# Patient Record
Sex: MALE | Race: WHITE | NOT HISPANIC OR LATINO | Employment: FULL TIME | URBAN - METROPOLITAN AREA
[De-identification: names, ages, dates, MRNs, and addresses within clinical notes are randomized per-mention and may not be internally consistent; named-entity substitution may affect disease eponyms.]

---

## 2017-11-22 DIAGNOSIS — M25.512 PAIN IN LEFT SHOULDER: ICD-10-CM

## 2017-12-05 ENCOUNTER — GENERIC CONVERSION - ENCOUNTER (OUTPATIENT)
Dept: OTHER | Facility: OTHER | Age: 32
End: 2017-12-05

## 2018-01-24 VITALS
SYSTOLIC BLOOD PRESSURE: 120 MMHG | DIASTOLIC BLOOD PRESSURE: 60 MMHG | HEIGHT: 70 IN | BODY MASS INDEX: 26.84 KG/M2 | WEIGHT: 187.5 LBS

## 2018-02-19 VITALS
BODY MASS INDEX: 25.77 KG/M2 | HEART RATE: 64 BPM | HEIGHT: 70 IN | DIASTOLIC BLOOD PRESSURE: 75 MMHG | SYSTOLIC BLOOD PRESSURE: 116 MMHG | WEIGHT: 180 LBS

## 2018-02-19 DIAGNOSIS — M54.50 ACUTE RIGHT-SIDED LOW BACK PAIN WITHOUT SCIATICA: ICD-10-CM

## 2018-02-19 DIAGNOSIS — S73.191A TEAR OF RIGHT ACETABULAR LABRUM, INITIAL ENCOUNTER: Primary | ICD-10-CM

## 2018-02-19 PROCEDURE — 99214 OFFICE O/P EST MOD 30 MIN: CPT | Performed by: ORTHOPAEDIC SURGERY

## 2018-02-19 RX ORDER — IBUPROFEN 200 MG
200 TABLET ORAL EVERY 6 HOURS PRN
COMMUNITY
End: 2018-08-28 | Stop reason: ALTCHOICE

## 2018-02-19 NOTE — PROGRESS NOTES
Assessment/Plan:  1  Tear of right acetabular labrum, initial encounter         Ab Velasquez has done extensive self-guided physical therapy with no relief of his symptoms  He does have a history of right hip partial labrum tear  MRI of his back from August 2017 done at [facility] was reviewed and did not show any significant findings to suggest his symptoms  We did request an official radiology report from [facility], the report is pending  We believe that Richard's symptoms may be stemming from his right hip labrum tear, that likely has worsened  As a result, we would like to do a diagnostic and therapeutic trial with an intra-articular hip injection  Order was placed today  We will tailor our treatment plan based on his response to therapy  In the meantime, we recommended continued self-guided physical therapy  Ab Velasquez wants to undertake conservative measures until after he gets  in April  Subjective:   Patient ID: Ambrosio Turpin is a 26 y o  male  HPI    Ab Velasquez is a 26year old male that comes to the office due to concerns of right-sided back pain  Of note, Ab Velasquez does report a history of right hip, partial labrum tear diagnosed approximately 3-4 years ago  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[facility]      He states that he likely experienced a back injury in August of 2017 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  He stated that he had a "twinge" at the time, but pain was accentuated a couple weeks later when he went to go  something from the floor  He subsequently obtained an MRI at [facility] which was reported as being grossly within normal limits  He has been performed self-guided physical therapy at home, completing Bhargav protocol A&B  He has also tried electro stimulation, heat, both not providing much relief  Currently, he is experiencing right-sided low-back pain, constant, aching in quality, 7-8/10 intensity, occasional radiation into the groin and down the anterior thigh  Aggravated by twisting motions  He also mentioned that he has to be extra cautious with certain exercises at the gym such as squats and leg press  Alleviated with lying on his back  Denies weakness, tingling, incontinence  Review of Systems   Constitutional: Negative for chills and fever  HENT: Negative for congestion, rhinorrhea and sore throat  Eyes: Negative for visual disturbance  Respiratory: Negative for shortness of breath and wheezing  Cardiovascular: Negative for chest pain and palpitations  Gastrointestinal: Negative for abdominal pain, constipation, diarrhea, nausea and vomiting  Genitourinary: Negative for dysuria  Musculoskeletal: Positive for back pain and myalgias  Negative for neck stiffness  Skin: Negative for rash  Neurological: Negative for weakness, numbness and headaches  Psychiatric/Behavioral: Negative for confusion  History reviewed  No pertinent past medical history  History reviewed  No pertinent surgical history  Family History   Problem Relation Age of Onset    Diabetes Father        Social History     Occupational History    Not on file  Social History Main Topics    Smoking status: Never Smoker    Smokeless tobacco: Never Used    Alcohol use No    Drug use: No    Sexual activity: Not on file         Current Outpatient Prescriptions:     ibuprofen (MOTRIN) 200 mg tablet, Take 200 mg by mouth every 6 (six) hours as needed for mild pain, Disp: , Rfl:     No Known Allergies    Objective:  Vitals:    02/19/18 1008   BP: 116/75   Pulse: 64       Right Hip Exam     Tenderness   The patient is experiencing no tenderness  Range of Motion   The patient has normal right hip ROM  Muscle Strength   The patient has normal right hip strength  Tests   ANDRE: negative    Comments:  + FADIR test      Back Exam     Tenderness   The patient is experiencing tenderness in the lumbar (right L4-L5 tenderness)      Range of Motion   The patient has normal back ROM  Muscle Strength   The patient has normal back strength  Tests   Straight leg raise right: negative  Straight leg raise left: negative    Comments:  + standing flexion on the right  Negative sitting flexion tests            Physical Exam   Constitutional: He is oriented to person, place, and time  He appears well-developed and well-nourished  No distress  HENT:   Head: Normocephalic and atraumatic  Right Ear: External ear normal    Left Ear: External ear normal    Eyes: Conjunctivae and EOM are normal  Right eye exhibits no discharge  Left eye exhibits no discharge  Neck: Neck supple  Neurological: He is alert and oriented to person, place, and time  Skin: Skin is warm and dry  He is not diaphoretic  Psychiatric: He has a normal mood and affect  I have personally reviewed pertinent films in PACS and my interpretation is MRI of lumbar spine showing slight disc bulging at levels L3-L4, L4-L5, L5-S1 without significant herniation  Adequate intra-vertebral spacing

## 2018-03-13 ENCOUNTER — HOSPITAL ENCOUNTER (OUTPATIENT)
Dept: RADIOLOGY | Facility: HOSPITAL | Age: 33
Discharge: HOME | End: 2018-03-13
Attending: RADIOLOGY

## 2018-03-13 DIAGNOSIS — Z76.89 REFERRAL OF PATIENT WITHOUT EXAMINATION OR TREATMENT: ICD-10-CM

## 2018-04-17 ENCOUNTER — TRANSCRIBE ORDERS (OUTPATIENT)
Dept: URGENT CARE | Facility: CLINIC | Age: 33
End: 2018-04-17

## 2018-04-17 DIAGNOSIS — Z02.89 ENCOUNTER FOR OCCUPATIONAL PHYSICAL EXAMINATION: Primary | ICD-10-CM

## 2018-04-17 PROCEDURE — 86480 TB TEST CELL IMMUN MEASURE: CPT | Performed by: PHYSICIAN ASSISTANT

## 2018-06-06 VITALS
WEIGHT: 176 LBS | HEIGHT: 70 IN | HEART RATE: 66 BPM | SYSTOLIC BLOOD PRESSURE: 120 MMHG | DIASTOLIC BLOOD PRESSURE: 79 MMHG | BODY MASS INDEX: 25.2 KG/M2

## 2018-06-06 DIAGNOSIS — M25.551 PAIN IN RIGHT HIP: Primary | ICD-10-CM

## 2018-06-06 PROBLEM — S43.402A SPRAIN OF SHOULDER, LEFT: Status: ACTIVE | Noted: 2017-12-05

## 2018-06-06 PROBLEM — S44.92XA NEUROPRAXIA OF LEFT UPPER EXTREMITY: Status: ACTIVE | Noted: 2017-12-05

## 2018-06-06 PROCEDURE — 99203 OFFICE O/P NEW LOW 30 MIN: CPT | Performed by: ORTHOPAEDIC SURGERY

## 2018-06-06 NOTE — PROGRESS NOTES
Patient Name:  Miguelito Nugent  MRN:  [id number]    Assessment & Plan    Right hip pain  Possible small labral tear versus sublabral sulcus  1  Patient wishes to continue conservative management at this time  2  Referral provided for physical therapy  3  Anti-inflammatories as needed  4  Follow-up if pain persists after six to eight weeks of conservative management  Consider intra-articular cortisone injection versus repeat MR arthrogram at that time as appropriate      Chief Complaint    Right hip pain      History of the Present Illness    47-year-old male reports to the office today for evaluation of his right hip  He has been experiencing intermittent right hip pain for the last 5-7 years  He denies any specific injury or trauma but associates the pain to playing baseball  He was seen in the past and underwent an MR arthrogram in 2016  He was treated conservatively with physical therapy and over-the-counter anti-inflammatories  He notes pain specifically in the groin and posterior hip  He notes occasional radiation distally along the thigh to the knee  He denies any significant numbness or tingling  No weakness or instability  No fevers or chills  Physical Exam    /79   Pulse 66   Ht 5' 10" (1 778 m)   Wt 79 8 kg (176 lb)   BMI 25 25 kg/m²     Right hip:  No gross deformity  No tenderness to palpation greater trochanter and anterior hip  Hip range of motion is intact and full without discomfort  Negative FADDIR and ANDRE test   Negative logroll test   Negative straight leg raise and resisted straight leg raise test   Sensation intact right lower extremity    Constitutional:  Well-developed and well-nourished  Eyes:  Anicteric sclerae  Neck:  Supple  Lungs:  Unlabored breathing  Cardiovascular:  Capillary refill is less than 2 seconds  Skin:  Intact without erythema  Neurologic:  Sensation intact to light touch  Psychiatric:  Mood and affect are appropriate        Data Review    I have personally reviewed pertinent films in PACS, and my interpretation follows  MR arthrogram right hip performed 8/4/16 reveals contrast admission into the under surface of the lateral margin of the superior labrum  This could represent a sublabral recess or possibly a small undersurface labral tear  History reviewed  No pertinent past medical history  History reviewed  No pertinent surgical history  No Known Allergies    Current Outpatient Prescriptions on File Prior to Visit   Medication Sig Dispense Refill    ibuprofen (MOTRIN) 200 mg tablet Take 200 mg by mouth every 6 (six) hours as needed for mild pain       No current facility-administered medications on file prior to visit  Social History   Substance Use Topics    Smoking status: Never Smoker    Smokeless tobacco: Never Used    Alcohol use No       Family History   Problem Relation Age of Onset    Diabetes Father     Atrial fibrillation Father          Review of Systems    General:  Negative for fever, lethargy/malaise, or night sweats  Eyes:  Negative for blurry vision or double vision  ENT:  Negative for hearing change, nasal discharge, or sore throat  Hematological:  Negative for bleeding problems or blood clots  Endocrine:  Negative for excessive thirst or temperature intolerance  Respiratory:  Negative for cough or wheezing  Cardiovascular:  Negative for chest pain, dyspnea on exertion, or palpitations  Gastrointestinal:  Negative for abdominal pain, diarrhea, or nausea/vomiting  Musculoskeletal:  As stated in the HPI and otherwise negative  Neurological:  Negative for confusion, headaches, or seizures  Psychological:  Negative for hallucinations or mood swings  Dermatological:  Negative for itching or rash        Scribe Attestation    I,:   Yumiko Rush PA-C am acting as a scribe while in the presence of the attending physician :        I,:   Haydee Laird MD personally performed the services described in this documentation    as scribed in my presence :

## 2018-06-26 ENCOUNTER — TELEPHONE (OUTPATIENT)
Dept: PAIN MEDICINE | Facility: CLINIC | Age: 33
End: 2018-06-26

## 2018-06-26 PROBLEM — S73.191A TEAR OF RIGHT ACETABULAR LABRUM: Status: ACTIVE | Noted: 2018-06-26

## 2018-06-26 NOTE — TELEPHONE ENCOUNTER
Scheduled pt for Rt Intra-articular hip injection for 7/13/18  ([id number]) Salome garcia pre-procedure instructions below:  Nothing to eat or drink 1 hr prior to procedure  Need to arrange transportation  Proper clothing for procedure  If ill or placed on antibiotics please call to reschedule

## 2018-07-13 ENCOUNTER — HOSPITAL ENCOUNTER (OUTPATIENT)
Facility: AMBULARY SURGERY CENTER | Age: 33
Setting detail: OUTPATIENT SURGERY
Discharge: HOME | End: 2018-07-13
Attending: ANESTHESIOLOGY | Admitting: ANESTHESIOLOGY
Payer: COMMERCIAL

## 2018-07-13 ENCOUNTER — HOSPITAL ENCOUNTER (OUTPATIENT)
Dept: RADIOLOGY | Facility: HOSPITAL | Age: 33
Setting detail: OUTPATIENT SURGERY
Discharge: HOME | End: 2018-07-13
Payer: COMMERCIAL

## 2018-07-13 VITALS
OXYGEN SATURATION: 98 % | HEART RATE: 67 BPM | RESPIRATION RATE: 16 BRPM | DIASTOLIC BLOOD PRESSURE: 56 MMHG | TEMPERATURE: 98.7 F | SYSTOLIC BLOOD PRESSURE: 120 MMHG

## 2018-07-13 PROCEDURE — 20610 DRAIN/INJ JOINT/BURSA W/O US: CPT | Performed by: ANESTHESIOLOGY

## 2018-07-13 PROCEDURE — 77002 NEEDLE LOCALIZATION BY XRAY: CPT | Performed by: ANESTHESIOLOGY

## 2018-07-13 PROCEDURE — 73501 X-RAY EXAM HIP UNI 1 VIEW: CPT

## 2018-07-13 RX ORDER — METHYLPREDNISOLONE ACETATE 80 MG/ML
INJECTION, SUSPENSION INTRA-ARTICULAR; INTRALESIONAL; INTRAMUSCULAR; SOFT TISSUE AS NEEDED
Status: DISCONTINUED | OUTPATIENT
Start: 2018-07-13 | End: 2018-07-13 | Stop reason: HOSPADM

## 2018-07-13 RX ORDER — LIDOCAINE WITH 8.4% SOD BICARB 0.9%(10ML)
SYRINGE (ML) INJECTION AS NEEDED
Status: DISCONTINUED | OUTPATIENT
Start: 2018-07-13 | End: 2018-07-13 | Stop reason: HOSPADM

## 2018-07-13 RX ORDER — BUPIVACAINE HYDROCHLORIDE 2.5 MG/ML
INJECTION, SOLUTION EPIDURAL; INFILTRATION; INTRACAUDAL AS NEEDED
Status: DISCONTINUED | OUTPATIENT
Start: 2018-07-13 | End: 2018-07-13 | Stop reason: HOSPADM

## 2018-07-13 NOTE — DISCHARGE INSTRUCTIONS
Steroid Joint Injection   WHAT YOU NEED TO KNOW:   A steroid joint injection is a procedure to inject steroid medicine into a joint  Steroid medicine decreases pain and inflammation  The injection may also contain an anesthetic (numbing medicine) to decrease pain  It may be done to treat conditions such as arthritis, gout, or carpal tunnel syndrome  The injections may be given in your knee, ankle, shoulder, elbow, wrist, ankle or sacroiliac joint  1  Do not apply heat to any area that is numb  If you have discomfort or soreness at the injection site, you may apply ice today, 20 minutes on and 20 minutes off  Tomorrow you may use ice or warm, moist heat  Do not apply ice or heat directly to the skin  2  You may have an increase or change in the discomfort for 36-48 hours after your treatment  Apply ice and continue with any pain medicine you have been prescribed  3  Do not do anything strenuous today  You may shower, but no tub baths or hot tubs today  You may resume your normal activities tomorrow, but do not overdo it  Resume normal activities slowly when you are feeling better  4  If you experience redness, drainage or swelling at the injection site, or if you develop a fever above 100 degrees, please call [facility] at [phone] or go to the Emergency Room  5  Continue to take all routine medicines prescribed by your primary care physician unless otherwise instructed by our staff  Most blood thinners should be started again according to your regularly scheduled dosing  If you have any questions, please give our office a call  If you have a problem specifically related to your procedure, please call our office at [phone]  Problems not related to your procedure should be directed to your primary care physician

## 2018-07-13 NOTE — H&P
History of Present Illness: The patient is a 26 y o  male who presents with complaints of right hip pain  Patient Active Problem List   Diagnosis    Neuropraxia of left upper extremity    Sprain of shoulder, left    Pain in right hip    Tear of right acetabular labrum       History reviewed  No pertinent past medical history  History reviewed  No pertinent surgical history  No current facility-administered medications for this encounter  No Known Allergies    Physical Exam:   Vitals:    07/13/18 1107   BP: 111/59   Pulse: 72   Resp: 16   Temp: 98 7 °F (37 1 °C)   SpO2: 98%     General: Awake, Alert, Oriented x 3, Mood and affect appropriate  Respiratory: Respirations even and unlabored  Cardiovascular: Peripheral pulses intact; no edema  Musculoskeletal Exam:  Tenderness noted in the right hip region    ASA Score:  3         Assessment:  Right hip pain  Plan:  Proceed with right hip intra-articular joint injection

## 2018-07-13 NOTE — OP NOTE
ATTENDING PHYSICIAN:  Winsome Ren MD     PROCEDURE: Right sacroiliac joint injection with steroid and local anesthetic under fluoroscopic guidance  PREPROCEDURE DIAGNOSIS:  Sacroiliitis  POSTPROCEDURE DIAGNOSIS: Sacroiliitis  ANESTHESIA:  Local     ESTIMATED BLOOD LOSS:  Minimal     COMPLICATIONS:  None  LOCATION:  [address]  CONSENT:  Today's procedure, its potential benefits as well as its risks and potential side effects were reviewed  Discussed risks of the procedure including bleeding, infection, nerve irritation or damage, reactions to the medications, headache, failure of the pain to improve, and potential worsening of the pain were explained to the patient who verbalized understanding and who wished to proceed  Written informed consent was thereby obtained  DESCRIPTION OF THE PROCEDURE:  After written informed consent was obtained, the patient was taken to the fluoroscopy suite and placed in the prone position  Anatomical landmarks were identified by way of fluoroscopy in multiple views  The skin overlying the sacroiliac region was prepped using antiseptic and draped in the usual sterile fashion  Strict aseptic technique was utilized  The skin and subcutaneous tissues at the needle entry site were infiltrated with 5 mL of 1% preservative-free lidocaine using a 25-gauge 1-1/2-inch needle  A 22-gauge 3-1/2-inch needle was then incrementally advanced using multiple fluoroscopic views into the inferior posterior pole of the sacroiliac joint  Proper needle placement was confirmed with the aid of fluoroscopy in the AP and lateral views  After negative aspiration, contrast was injected which delineated the sacroiliac joint under fluoroscopy in the AP view  After negative aspiration was reconfirmed, a 3 mL mixture consisting of 2 mL of preservative-free 0 25% bupivacaine mixed with 1 mL of 80 mg/mL of Depo-Medrol was slowly injected      The patient tolerated the procedure well and all needles were removed with the tips intact  Hemostasis was maintained  There were no apparent paresthesias or complications  The skin was wiped clean and a Band-Aid was placed as appropriate  The patient was monitored for an appropriate period of time following the procedure and remained hemodynamically stable and neurovascularly intact following the procedure  The patient was ultimately discharged to home with supervision in good condition and instructed to call the office in a few days for an update or sooner as warranted  I was present and participated in all key and critical portions of this procedure      Mick Mcmanus MD  7/13/2018  12:14 PM

## 2018-07-24 ENCOUNTER — TELEPHONE (OUTPATIENT)
Dept: PAIN MEDICINE | Facility: CLINIC | Age: 33
End: 2018-07-24

## 2018-07-24 NOTE — TELEPHONE ENCOUNTER
S/p Intra-articular hip joint injection on 7/13/18 by Dr Jese Royal, no follow up scheduled  Spoke w/ pt and he stated that he received 25% relief and rates his pain 5/10  He said he feels discomfort and it comes and goes  Other than that hes doing pretty good

## 2018-08-06 NOTE — OP NOTE
ATTENDING PHYSICIAN:  Floyd Cavazos MD      PREPROCEDURE DIAGNOSIS:  Right hip pain  POSTPROCEDURE DIAGNOSIS: Right hip pain  PROCEDURE: Right intraarticular hip injection under fluoroscopic guidance  ANESTHESIA:  Local     ESTIMATED BLOOD LOSS:  Minimal     COMPLICATIONS:  None  LOCATION:  [address]  CONSENT:  Today's procedure, its risks, benefits, and alternatives were explained in detail to the patient  Risks include, but are not limited bleeding, infection, hematoma formation, abscess formation, weakness, nerve irritation or damage, failure of the pain to improve, or potential worsening of the pain  The patient verbalized understanding and wished to proceed with the procedure  Written informed consent was thereby obtained  DESCRIPTION OF THE PROCEDURE:  After written informed consent was obtained, the patient was taken to the fluoroscopy suite and placed in the supine position  Anatomical landmarks were identified by way of fluoroscopy in the AP and oblique views  The patient's hip region was prepped using antiseptic solution and draped in the usual sterile fashion  Strict aseptic technique was utilized  The skin and subcutaneous tissues at the needle entry site was infiltrated with a small amount of 1% preservative free Lidocaine using a 25-gauge 1-1/2 inch needle  A 22 gauge 3-1/2 inch needle was then advanced incrementally under fluoroscopic guidance towards the identified point until os was contacted and the joint space was entered  After negative aspiration, 1 ml of contrast solution was injected showing an appropriate arthrogram   Subsequently, a solution consisting of 4 ml of 0 25% Bupivacaine mixed with 1 ml of Depo-Medrol 80 mg/ml was injected slowly  All needles were removed with the tips intact and hemostasis was maintained throughout    The patient tolerated the procedure well, and there were no apparent paresthesias or complications noted during or following this procedure  The antiseptic was wiped clean and Band-Aids were placed as appropriate  The patient was transferred to the recovery area and was observed for an appropriate period of time during which the patient remained hemodynamically stable and neurovascularly intact, as the patient was prior to the procedure  The patient was subsequently discharged to home in stable condition with supervision  The patient was instructed to call the office in a few days for an update  Discharge instructions were provided  I was present and participated in all key and critical portions of this procedure      Sade Eaton MD  8/6/2018  9:40 AM

## 2018-08-28 ENCOUNTER — TELEPHONE (OUTPATIENT)
Dept: OBGYN CLINIC | Facility: CLINIC | Age: 33
End: 2018-08-28

## 2018-08-28 DIAGNOSIS — S73.191A TEAR OF RIGHT ACETABULAR LABRUM, INITIAL ENCOUNTER: Primary | ICD-10-CM

## 2018-08-28 RX ORDER — NAPROXEN 500 MG/1
500 TABLET ORAL 2 TIMES DAILY WITH MEALS
Qty: 60 TABLET | Refills: 0 | Status: SHIPPED | OUTPATIENT
Start: 2018-08-28 | End: 2019-09-30

## 2018-08-28 RX ORDER — CYCLOBENZAPRINE HCL 10 MG
10 TABLET ORAL 3 TIMES DAILY PRN
Qty: 20 TABLET | Refills: 0 | Status: SHIPPED | OUTPATIENT
Start: 2018-08-28 | End: 2019-10-14 | Stop reason: ALTCHOICE

## 2018-08-28 NOTE — TELEPHONE ENCOUNTER
Patient called, having increased pain right hip     Are we able to call in an anti-inflammatory/muscle relaxer?  nubia    Can he see someone for his hip

## 2018-08-28 NOTE — TELEPHONE ENCOUNTER
Meds sent to [facility]    Referral for dr Rudi Mosley placed in chart  He sees patients in [place], he can determine if he needs a repeat hip MRI or not

## 2018-08-31 ENCOUNTER — APPOINTMENT (OUTPATIENT)
Dept: RADIOLOGY | Facility: CLINIC | Age: 33
End: 2018-08-31
Payer: COMMERCIAL

## 2018-08-31 VITALS
SYSTOLIC BLOOD PRESSURE: 105 MMHG | WEIGHT: 184 LBS | DIASTOLIC BLOOD PRESSURE: 69 MMHG | HEART RATE: 66 BPM | BODY MASS INDEX: 26.34 KG/M2 | HEIGHT: 70 IN

## 2018-08-31 DIAGNOSIS — S73.191A TEAR OF RIGHT ACETABULAR LABRUM, INITIAL ENCOUNTER: ICD-10-CM

## 2018-08-31 PROCEDURE — 73502 X-RAY EXAM HIP UNI 2-3 VIEWS: CPT

## 2018-08-31 PROCEDURE — 99214 OFFICE O/P EST MOD 30 MIN: CPT | Performed by: ORTHOPAEDIC SURGERY

## 2018-08-31 NOTE — PROGRESS NOTES
Orthopaedic Surgery - Office Note  Susie Paget (26 y o  male)   : 1985   MRN: [id number]  Encounter Date: 2018    Chief Complaint   Patient presents with    Right Hip - Pain       Assessment / Plan  Right hip pain with likely labral tear    · At this time we will obtain a MRI arthrogram of the right hip to further evaluate patient's possible labral tear  · Continue with exercise program   · Continue with ice and analgesics as needed  Return for Follow-up after MRI arthrogram study  History of Present Illness  Susie Paget is a 26 y o  male who presents for evaluation of right hip pain and clicking which been present for years  Patient believes is been ongoing problem off and on for father 7 years but increasing lately  He denies any history of injury to the right hip  The patient states he did have an MRI arthrogram in  which showed a possible superior labral tear  Since he has done exercises for 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  When asked where the pain is he points to right groin and states it radiates around in a C pattern to the gluteal area  Occasionally gets some thigh pain that radiates to the knee  Patient does have palpable clicking that he can reproduce when going from flexion to extension in both hips  The clicking does not cause pain it actually seems to relieve some pressure in the area  He did undergo a fluoroscopy guided hip injection on 2018 and feels that the injection did help for a few days but he slowly progressed to where he was previously  Currently takes over-the-counter medication as needed for the pain  Review of Systems  Pertinent items are noted in HPI  All other systems were reviewed and are negative  Physical Exam  /69   Pulse 66   Ht 5' 10" (1 778 m)   Wt 83 5 kg (184 lb)   BMI 26 40 kg/m²   Cons: Appears well  No apparent distress  Psych: Alert  Oriented x3    Mood and affect normal   Eyes: SALVADOR EOMI  Resp: Normal effort  No audible wheezing or stridor  CV: Palpable pulse  No discernable arrhythmia  No LE edema  Lymph:  No palpable cervical, axillary, or inguinal lymphadenopathy  Skin: Warm  No palpable masses  No visible lesions  Neuro: Normal muscle tone  Normal and symmetric DTR's  Right Hip Exam  Alignment / Posture:  Normal resting hip posture  Normal knee alignment  Inspection:  No swelling  No edema  No deformity  Palpation:  No tenderness  ROM:  Normal hip ROM  Hip Flexion 110°  Hip ER 75°  Hip IR 20° with pain in the groin  Strength:  5/5 hip flexors and abductors  5/5 quadriceps and hamstrings  Stability:  No objective hip instability  (-) Logroll  (-) Anterior apprehension test  (-) Posterior apprehension test   Tests:  (+) FADDIR  (-) ANDRE  (-) Straight leg raise  Neurovascular:  Sensation intact in DP/SP/Watts/Sa/T nerve distributions  Sensation intact in all digital nerve distributions  Toes warm and perfused  Gait:  Normal     Studies Reviewed  XR of right hip - Impingement series from 08/31/2018 shows small CAM lesion on impingement views otherwise no joint space narrowing  There is no fracture dislocation seen  There is a small calcification in the superior lateral aspect of the acetabulum  Procedures  No procedures today  Medical, Surgical, Family, and Social History  The patient's medical history, family history, and social history, were reviewed and updated as appropriate  History reviewed  No pertinent past medical history  Past Surgical History:   Procedure Laterality Date    TN ARTHROCENTESIS ASPIR&/INJ MAJOR JT/BURSA W/O US Right 7/13/2018    Procedure: Intra-articular hip joint injection;  Surgeon: Floyd Cavazos MD;  [facility]: [facility] MAIN OR;  Service: Pain Management        Family History   Problem Relation Age of Onset    Diabetes Father     Atrial fibrillation Father        Social History     Occupational History    Not on file       Social History Main Topics    Smoking status: Never Smoker    Smokeless tobacco: Never Used    Alcohol use No    Drug use: No    Sexual activity: Not on file       No Known Allergies      Current Outpatient Prescriptions:     cyclobenzaprine (FLEXERIL) 10 mg tablet, Take 1 tablet (10 mg total) by mouth 3 (three) times a day as needed for muscle spasms, Disp: 20 tablet, Rfl: 0    naproxen (NAPROSYN) 500 mg tablet, Take 1 tablet (500 mg total) by mouth 2 (two) times a day with meals, Disp: 60 tablet, Rfl: 0      Nate Hammond PA-C    Scribe Attestation    I,:    am acting as a scribe while in the presence of the attending physician :        I,:    personally performed the services described in this documentation    as scribed in my presence :

## 2018-09-18 VITALS
DIASTOLIC BLOOD PRESSURE: 69 MMHG | HEIGHT: 70 IN | BODY MASS INDEX: 26.34 KG/M2 | HEART RATE: 60 BPM | WEIGHT: 184 LBS | SYSTOLIC BLOOD PRESSURE: 106 MMHG

## 2018-09-18 DIAGNOSIS — S73.191A TEAR OF RIGHT ACETABULAR LABRUM, INITIAL ENCOUNTER: Primary | ICD-10-CM

## 2018-09-18 PROCEDURE — 99213 OFFICE O/P EST LOW 20 MIN: CPT | Performed by: ORTHOPAEDIC SURGERY

## 2018-09-18 NOTE — PROGRESS NOTES
Orthopaedic Surgery - Office Note  Yannick Downing (26 y o  male)   : 1985   MRN: [id number]  Encounter Date: 2018    Chief Complaint   Patient presents with    Right Hip - Follow-up       Assessment / Plan  Right hip superior labral tear with psoas tendon snapping     · we did discuss a possible psoas tendon release if he fails conservative management with treating his SI jt, labral tear and psoas tendon    · A formal physical therapy referral was given to the patient today for a core program, hip strengthening and psoas stretching  · We did discuss a possible SI jt injection as well   Return in about 2 months (around 2018) for Recheck of RIGHT hip  History of Present Illness  Yannick Downing is a 26 y o  male who presents as a follow up for right hip pain and clicking that has been present for years  He denies any trauma or injury to the right hip  Pt is here to review his MRI arthrogram today  He has done exercises for 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  When asked where the pain is he points to right groin and states it radiates around in a C pattern to the gluteal area  Occasionally gets some thigh pain that radiates to the knee  Patient does have palpable clicking that he can reproduce when going from flexion to extension in both hips  The clicking does not cause pain it actually seems to relieve some pressure in the area  He did undergo a fluoroscopy guided hip injection on 2018 and feels that the injection did help for a few days but he slowly progressed to where he was previously  Pt is also c/o SI jt pain at the end of a working day at work  Currently takes over-the-counter medication as needed for the pain  Pt denies numbness and tingling  Review of Systems  Pertinent items are noted in HPI  All other systems were reviewed and are negative      Physical Exam  /69   Pulse 60   Ht 5' 10" (1 778 m)   Wt 83 5 kg (184 lb)   BMI 26 40 kg/m² Cons: Appears well  No apparent distress  Psych: Alert  Oriented x3  Mood and affect normal   Eyes: PERRLA, EOMI  Resp: Normal effort  No audible wheezing or stridor  CV: Palpable pulse  No discernable arrhythmia  No LE edema  Lymph:  No palpable cervical, axillary, or inguinal lymphadenopathy  Skin: Warm  No palpable masses  No visible lesions  Neuro: Normal muscle tone  Normal and symmetric DTR's  Right Hip Exam  Alignment / Posture:  Normal resting hip posture  Inspection:  No swelling  No deformity  Palpation:  minimal SI jt  tenderness  snapping over psoas tendon with active hip flexion   ROM:  Hip Flexion 110  Hip ER 75  Hip IR 20  Strength:  5/5 hip flexors and abductors  5/5 quadriceps and hamstrings  Stability:  No objective hip instability  (-) Logroll  Tests:  (-) Stinchfield  (-) ANDRE  (-) Straight leg raise  Neurovascular:  Sensation intact in DP/SP/Watts/Sa/T nerve distributions  2+ DP & PT pulses  Gait:  Normal     Studies Reviewed  MRI arthrogram of right hip - shows superior labral tear of right hip    Procedures  No procedures today  Medical, Surgical, Family, and Social History  The patient's medical history, family history, and social history, were reviewed and updated as appropriate  History reviewed  No pertinent past medical history  Past Surgical History:   Procedure Laterality Date    [place] ARTHROCENTESIS ASPIR&/INJ MAJOR JT/BURSA W/O US Right 7/13/2018    Procedure: Intra-articular hip joint injection;  Surgeon: Jennifer Avelar MD;  Location: [facility] MAIN OR;  Service: Pain Management        Family History   Problem Relation Age of Onset    Diabetes Father     Atrial fibrillation Father        Social History     Occupational History    Not on file       Social History Main Topics    Smoking status: Never Smoker    Smokeless tobacco: Never Used    Alcohol use No    Drug use: No    Sexual activity: Not on file       No Known Allergies      Current Outpatient Prescriptions:     cyclobenzaprine (FLEXERIL) 10 mg tablet, Take 1 tablet (10 mg total) by mouth 3 (three) times a day as needed for muscle spasms (Patient taking differently: Take 10 mg by mouth as needed for muscle spasms  ), Disp: 20 tablet, Rfl: 0    naproxen (NAPROSYN) 500 mg tablet, Take 1 tablet (500 mg total) by mouth 2 (two) times a day with meals (Patient taking differently: Take 500 mg by mouth as needed  ), Disp: 60 tablet, Rfl: 0      Janet Wilks    Scribe Attestation    I,:   Enriqueta Claire am acting as a scribe while in the presence of the attending physician :        I,:   Felicia Turner MD personally performed the services described in this documentation    as scribed in my presence :

## 2019-05-14 ENCOUNTER — EVALUATION (OUTPATIENT)
Dept: PHYSICAL THERAPY | Facility: CLINIC | Age: 34
End: 2019-05-14
Payer: COMMERCIAL

## 2019-05-14 DIAGNOSIS — S73.191D TEAR OF RIGHT ACETABULAR LABRUM, SUBSEQUENT ENCOUNTER: ICD-10-CM

## 2019-05-14 DIAGNOSIS — Z98.890 S/P HIP ARTHROSCOPY: Primary | ICD-10-CM

## 2019-05-14 PROCEDURE — G8979 MOBILITY GOAL STATUS: HCPCS

## 2019-05-14 PROCEDURE — G8978 MOBILITY CURRENT STATUS: HCPCS

## 2019-05-14 PROCEDURE — 97161 PT EVAL LOW COMPLEX 20 MIN: CPT

## 2019-05-15 ENCOUNTER — TRANSCRIBE ORDERS (OUTPATIENT)
Dept: PHYSICAL THERAPY | Facility: CLINIC | Age: 34
End: 2019-05-15

## 2019-05-15 DIAGNOSIS — Z98.890 STATUS POST ARTHROSCOPY OF HIP: Primary | ICD-10-CM

## 2019-05-17 ENCOUNTER — OFFICE VISIT (OUTPATIENT)
Dept: PHYSICAL THERAPY | Facility: CLINIC | Age: 34
End: 2019-05-17
Payer: COMMERCIAL

## 2019-05-17 DIAGNOSIS — Z98.890 S/P HIP ARTHROSCOPY: Primary | ICD-10-CM

## 2019-05-17 DIAGNOSIS — S73.191D TEAR OF RIGHT ACETABULAR LABRUM, SUBSEQUENT ENCOUNTER: ICD-10-CM

## 2019-05-17 PROCEDURE — 97110 THERAPEUTIC EXERCISES: CPT

## 2019-05-17 PROCEDURE — 97140 MANUAL THERAPY 1/> REGIONS: CPT

## 2019-05-20 ENCOUNTER — OFFICE VISIT (OUTPATIENT)
Dept: PHYSICAL THERAPY | Facility: CLINIC | Age: 34
End: 2019-05-20
Payer: COMMERCIAL

## 2019-05-20 DIAGNOSIS — Z98.890 S/P HIP ARTHROSCOPY: Primary | ICD-10-CM

## 2019-05-20 DIAGNOSIS — S73.191D TEAR OF RIGHT ACETABULAR LABRUM, SUBSEQUENT ENCOUNTER: ICD-10-CM

## 2019-05-20 PROCEDURE — 97140 MANUAL THERAPY 1/> REGIONS: CPT

## 2019-05-22 ENCOUNTER — OFFICE VISIT (OUTPATIENT)
Dept: PHYSICAL THERAPY | Facility: CLINIC | Age: 34
End: 2019-05-22
Payer: COMMERCIAL

## 2019-05-22 DIAGNOSIS — Z98.890 S/P HIP ARTHROSCOPY: Primary | ICD-10-CM

## 2019-05-22 DIAGNOSIS — S73.191D TEAR OF RIGHT ACETABULAR LABRUM, SUBSEQUENT ENCOUNTER: ICD-10-CM

## 2019-05-22 PROCEDURE — 97112 NEUROMUSCULAR REEDUCATION: CPT

## 2019-05-22 PROCEDURE — 97140 MANUAL THERAPY 1/> REGIONS: CPT

## 2019-05-22 PROCEDURE — 97110 THERAPEUTIC EXERCISES: CPT

## 2019-05-28 ENCOUNTER — OFFICE VISIT (OUTPATIENT)
Dept: PHYSICAL THERAPY | Facility: CLINIC | Age: 34
End: 2019-05-28
Payer: COMMERCIAL

## 2019-05-28 DIAGNOSIS — Z98.890 S/P HIP ARTHROSCOPY: Primary | ICD-10-CM

## 2019-05-28 DIAGNOSIS — S73.191D TEAR OF RIGHT ACETABULAR LABRUM, SUBSEQUENT ENCOUNTER: ICD-10-CM

## 2019-05-28 PROCEDURE — 97140 MANUAL THERAPY 1/> REGIONS: CPT

## 2019-05-28 PROCEDURE — 97110 THERAPEUTIC EXERCISES: CPT

## 2019-05-29 ENCOUNTER — OFFICE VISIT (OUTPATIENT)
Dept: PHYSICAL THERAPY | Facility: CLINIC | Age: 34
End: 2019-05-29
Payer: COMMERCIAL

## 2019-05-29 DIAGNOSIS — Z98.890 S/P HIP ARTHROSCOPY: Primary | ICD-10-CM

## 2019-05-29 DIAGNOSIS — S73.191D TEAR OF RIGHT ACETABULAR LABRUM, SUBSEQUENT ENCOUNTER: ICD-10-CM

## 2019-05-29 PROCEDURE — 97140 MANUAL THERAPY 1/> REGIONS: CPT

## 2019-05-29 PROCEDURE — 97112 NEUROMUSCULAR REEDUCATION: CPT

## 2019-05-29 PROCEDURE — 97110 THERAPEUTIC EXERCISES: CPT

## 2019-05-31 ENCOUNTER — OFFICE VISIT (OUTPATIENT)
Dept: PHYSICAL THERAPY | Facility: CLINIC | Age: 34
End: 2019-05-31
Payer: COMMERCIAL

## 2019-05-31 DIAGNOSIS — S73.191D TEAR OF RIGHT ACETABULAR LABRUM, SUBSEQUENT ENCOUNTER: ICD-10-CM

## 2019-05-31 DIAGNOSIS — Z98.890 S/P HIP ARTHROSCOPY: Primary | ICD-10-CM

## 2019-05-31 PROCEDURE — 97140 MANUAL THERAPY 1/> REGIONS: CPT

## 2019-05-31 PROCEDURE — 97112 NEUROMUSCULAR REEDUCATION: CPT

## 2019-05-31 PROCEDURE — 97110 THERAPEUTIC EXERCISES: CPT

## 2019-06-03 ENCOUNTER — OFFICE VISIT (OUTPATIENT)
Dept: PHYSICAL THERAPY | Facility: CLINIC | Age: 34
End: 2019-06-03
Payer: COMMERCIAL

## 2019-06-03 DIAGNOSIS — Z98.890 S/P HIP ARTHROSCOPY: Primary | ICD-10-CM

## 2019-06-03 DIAGNOSIS — S73.191D TEAR OF RIGHT ACETABULAR LABRUM, SUBSEQUENT ENCOUNTER: ICD-10-CM

## 2019-06-03 PROCEDURE — 97140 MANUAL THERAPY 1/> REGIONS: CPT

## 2019-06-03 PROCEDURE — 97110 THERAPEUTIC EXERCISES: CPT

## 2019-06-05 ENCOUNTER — OFFICE VISIT (OUTPATIENT)
Dept: PHYSICAL THERAPY | Facility: CLINIC | Age: 34
End: 2019-06-05
Payer: COMMERCIAL

## 2019-06-05 DIAGNOSIS — S73.191D TEAR OF RIGHT ACETABULAR LABRUM, SUBSEQUENT ENCOUNTER: ICD-10-CM

## 2019-06-05 DIAGNOSIS — Z98.890 S/P HIP ARTHROSCOPY: Primary | ICD-10-CM

## 2019-06-05 PROCEDURE — 97140 MANUAL THERAPY 1/> REGIONS: CPT

## 2019-06-10 ENCOUNTER — OFFICE VISIT (OUTPATIENT)
Dept: PHYSICAL THERAPY | Facility: CLINIC | Age: 34
End: 2019-06-10
Payer: COMMERCIAL

## 2019-06-10 DIAGNOSIS — S73.191D TEAR OF RIGHT ACETABULAR LABRUM, SUBSEQUENT ENCOUNTER: ICD-10-CM

## 2019-06-10 DIAGNOSIS — Z98.890 S/P HIP ARTHROSCOPY: Primary | ICD-10-CM

## 2019-06-10 PROCEDURE — 97140 MANUAL THERAPY 1/> REGIONS: CPT

## 2019-06-10 PROCEDURE — G8978 MOBILITY CURRENT STATUS: HCPCS

## 2019-06-10 PROCEDURE — G8979 MOBILITY GOAL STATUS: HCPCS

## 2019-06-12 ENCOUNTER — OFFICE VISIT (OUTPATIENT)
Dept: PHYSICAL THERAPY | Facility: CLINIC | Age: 34
End: 2019-06-12
Payer: COMMERCIAL

## 2019-06-12 DIAGNOSIS — S73.191D TEAR OF RIGHT ACETABULAR LABRUM, SUBSEQUENT ENCOUNTER: ICD-10-CM

## 2019-06-12 DIAGNOSIS — Z98.890 S/P HIP ARTHROSCOPY: Primary | ICD-10-CM

## 2019-06-12 PROCEDURE — 97140 MANUAL THERAPY 1/> REGIONS: CPT

## 2019-06-12 PROCEDURE — 97112 NEUROMUSCULAR REEDUCATION: CPT

## 2019-06-12 PROCEDURE — 97110 THERAPEUTIC EXERCISES: CPT

## 2019-06-17 ENCOUNTER — APPOINTMENT (OUTPATIENT)
Dept: PHYSICAL THERAPY | Facility: CLINIC | Age: 34
End: 2019-06-17
Payer: COMMERCIAL

## 2019-06-18 ENCOUNTER — APPOINTMENT (OUTPATIENT)
Dept: PHYSICAL THERAPY | Facility: CLINIC | Age: 34
End: 2019-06-18
Payer: COMMERCIAL

## 2019-06-18 NOTE — PROGRESS NOTES
Daily Note     Today's date: 2019  Patient name: Jono Escobar  : 1985  MRN: [id number]  Referring provider: Edison Kline MD  Dx:   Encounter Diagnoses   Name Primary?     S/P hip arthroscopy Yes    Tear of right acetabular labrum, subsequent encounter                   Subjective: Pt reports {GRSUBDAILY:40406}  Pain Rating: {Pain Score 0-10, 0 default:08855}/10    Objective: See treatment diary below    Manual 6/12/19 6/18/19  6/5/19 6/10/19   PROM Right hip all planes Right hip all planes  Right hip all planes Right hip all planes   STM IASTM right quad/ITB IASTM right quad/ITB  IASTM right quad/ITB IASTM right quad/ITB   Joint mobs    LAT    Man Flexibility HS/quad/rectus HS/quad/rectus  HS/quad/rectus HS/quad/rectus   LAT Gr II-III Gr II-III              Exercise Diary         Upright bike 5 min, lv 1   5 min lv1 5 min lv1   Clamshells s/l 2x10 Short side bridges 2x10 franny yellow  s/l 2x10 yellow TB s/l 2x10 yellow TB   Ball squeeze        SLR ext/abd/add Hold 5, 2x10 abd    Abd 2x10   Bridges PB x15/SL 2x5 PB x15/SL 2x5  x15 w/march /x15 PB x10 w/march /x10 PB   Heel slides    SL 15x5" ea SL 15x5" ea   SKTC    L only 10x10"    Prone ER/IR isometrics Standing stool ER/IR 15x5" ea yellow band Standing stool ER/IR 15x5" ea yellow band  Standing stool ER/IR 15x5" ea Standing stool ER/IR 15x5" ea   Seated hip flex Standing marching x15 franny Single leg row/rotaion x15 franny 5 0   Standing marching x15 franny   Squats x15/high low x15 x15/high low x15  x10 x10/high low x10   SLS w/ball toss 2x25 blue foam 3 way x20 ea blue foam  3x20" blue foam w/ball toss 2x20   Supine hip flexor stretch Lunge stretch 5x20" Lunge stretch 5x20"  Lunge stretch 5x20" Lunge stretch 5x20"   [place] Hip ext/abd x15 franny Hip ext/abd x15 franny  15x3" franny Hip ext/abd x15 franny   Step ups Lat step downs x15 franny 6in Lat step downs x15 franny 8in  x15 8in Lat step downs x15 franny 6in   Quadruped rocking 10x10" 10x10"  10x10" 10x10"   Lat band walks 4x20ft red 4x20ft green      Modalities        CP    x10 mins post                Assessment: {ASSESSMENT:03565}      Plan: Continue per plan of care  Progress treatment as tolerated

## 2019-06-19 ENCOUNTER — APPOINTMENT (OUTPATIENT)
Dept: PHYSICAL THERAPY | Facility: CLINIC | Age: 34
End: 2019-06-19
Payer: COMMERCIAL

## 2019-06-25 ENCOUNTER — OFFICE VISIT (OUTPATIENT)
Dept: PHYSICAL THERAPY | Facility: CLINIC | Age: 34
End: 2019-06-25
Payer: COMMERCIAL

## 2019-06-25 DIAGNOSIS — S73.191D TEAR OF RIGHT ACETABULAR LABRUM, SUBSEQUENT ENCOUNTER: ICD-10-CM

## 2019-06-25 DIAGNOSIS — Z98.890 S/P HIP ARTHROSCOPY: Primary | ICD-10-CM

## 2019-06-25 PROCEDURE — 97140 MANUAL THERAPY 1/> REGIONS: CPT

## 2019-06-25 PROCEDURE — 97110 THERAPEUTIC EXERCISES: CPT

## 2019-06-25 PROCEDURE — 97112 NEUROMUSCULAR REEDUCATION: CPT

## 2019-09-30 ENCOUNTER — OFFICE VISIT (OUTPATIENT)
Dept: URGENT CARE | Facility: CLINIC | Age: 34
End: 2019-09-30
Payer: COMMERCIAL

## 2019-09-30 VITALS
TEMPERATURE: 98 F | BODY MASS INDEX: 26.4 KG/M2 | RESPIRATION RATE: 16 BRPM | WEIGHT: 188.6 LBS | SYSTOLIC BLOOD PRESSURE: 125 MMHG | HEIGHT: 71 IN | HEART RATE: 72 BPM | OXYGEN SATURATION: 98 % | DIASTOLIC BLOOD PRESSURE: 75 MMHG

## 2019-09-30 DIAGNOSIS — M54.50 ACUTE MIDLINE LOW BACK PAIN WITHOUT SCIATICA: Primary | ICD-10-CM

## 2019-09-30 PROCEDURE — 99213 OFFICE O/P EST LOW 20 MIN: CPT | Performed by: PHYSICIAN ASSISTANT

## 2019-09-30 RX ORDER — NAPROXEN 500 MG/1
500 TABLET ORAL 2 TIMES DAILY WITH MEALS
Qty: 14 TABLET | Refills: 0 | Status: SHIPPED | OUTPATIENT
Start: 2019-09-30 | End: 2020-08-13 | Stop reason: ALTCHOICE

## 2019-09-30 RX ORDER — CYCLOBENZAPRINE HCL 5 MG
10 TABLET ORAL 3 TIMES DAILY PRN
Qty: 21 TABLET | Refills: 0 | Status: SHIPPED | OUTPATIENT
Start: 2019-09-30 | End: 2019-10-14 | Stop reason: ALTCHOICE

## 2019-09-30 NOTE — PROGRESS NOTES
[address]        NAME: Amparo Daley is a 37 y o  male  : 1985    MRN: [id number]  DATE: 2019  TIME: 11:44 PM    Assessment and Plan   Acute midline low back pain without sciatica [M54 5]  1  Acute midline low back pain without sciatica  naproxen (NAPROSYN) 500 mg tablet    cyclobenzaprine (FLEXERIL) 5 mg tablet         Patient Instructions   Lumbago without sciatica:   -We discussed that this appears to be muscular in nature and at this time does not seem to be secondary to herniation or nerve involvement as there is no radiculopathy  Will start the patient on Naproxen 500mg to be taken as prescribed for pain  Take with meals  Will also prescribe cyclobenzaprine 10mg to be taken as needed for muscle spasm  You cannot drive or operate machinery while on this medication    -Ice the area for 20 minutes 3-4 times a day  We discussed he can also attempt warm compress, light massage and stretching   -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    Follow up with PCP in 3-5 days  Proceed to  ER if symptoms worsen  Chief Complaint     Chief Complaint   Patient presents with    Back Pain     has been sleeping on couch x 2 weeks has baby in MBR back  pain middle lumbar has used ice, stretching, works 10hr day at [employer]          History of Present Illness       The patient is a 41-year-old male who presents today for low back pain x 1 day  The patient states that he has a three month old infant at home states that he has been sleeping on the couch which he feels precipitated the pain  He states that today the pain has worsened and is over the midline of the lower lumbar spine   He states that the pain is exacerbated with flexion of the back, the pain is relieved with rest  He states that the pain is non-radiating but he does have right hip pain at his baseline as he had a labral tear repaired in 5/2019  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  He denies weakness, numbness or tingling of the lower extremities  He denies saddle anesthesia or urinary or fecal incontinence  Review of Systems   Review of Systems   Constitutional: Negative for activity change, appetite change, chills, fatigue and fever  Musculoskeletal: Positive for arthralgias and back pain  Negative for gait problem, joint swelling, myalgias, neck pain and neck stiffness  Skin: Negative for color change, pallor, rash and wound  Allergic/Immunologic: Negative for immunocompromised state  Neurological: Negative for dizziness, weakness, light-headedness and numbness  Hematological: Does not bruise/bleed easily  Current Medications       Current Outpatient Medications:     Aspirin (ECOTRIN PO), Take by mouth, Disp: , Rfl:     Celecoxib (CELEBREX PO), Take by mouth, Disp: , Rfl:     cyclobenzaprine (FLEXERIL) 10 mg tablet, Take 1 tablet (10 mg total) by mouth 3 (three) times a day as needed for muscle spasms (Patient not taking: Reported on 9/30/2019), Disp: 20 tablet, Rfl: 0    cyclobenzaprine (FLEXERIL) 5 mg tablet, Take 2 tablets (10 mg total) by mouth 3 (three) times a day as needed for muscle spasms, Disp: 21 tablet, Rfl: 0    naproxen (NAPROSYN) 500 mg tablet, Take 1 tablet (500 mg total) by mouth 2 (two) times a day with meals, Disp: 14 tablet, Rfl: 0    Current Allergies     Allergies as of 09/30/2019    (No Known Allergies)            The following portions of the patient's history were reviewed and updated as appropriate: allergies, current medications, past family history, past medical history, past social history, past surgical history and problem list      History reviewed  No pertinent past medical history      Past Surgical History: Procedure Laterality Date    [place] ARTHROCENTESIS ASPIR&/INJ MAJOR JT/BURSA W/O US Right 7/13/2018    Procedure: Intra-articular hip joint injection;  Surgeon: Bravo Crisostomo MD;  Location: [place] [address] MAIN OR;  Service: Pain Management        Family History   Problem Relation Age of Onset    Diabetes Father     Atrial fibrillation Father          Medications have been verified  Objective   /75   Pulse 72   Temp 98 °F (36 7 °C)   Resp 16   Ht 5' 10 5" (1 791 m)   Wt 85 5 kg (188 lb 9 6 oz)   SpO2 98%   BMI 26 68 kg/m²        Physical Exam     Physical Exam   Constitutional: He appears well-developed and well-nourished  No distress  Cardiovascular: Normal rate, regular rhythm and normal heart sounds  Pulmonary/Chest: Effort normal and breath sounds normal    Musculoskeletal:        Thoracic back: Normal         Lumbar back: He exhibits pain and spasm  He exhibits normal range of motion, no tenderness, no bony tenderness, no swelling, no edema, no deformity, no laceration and normal pulse  There is pain with flexion of the back  There is no tenderness to palpation of the paraspinal muscles  There is no spinal tenderness or step-off palpated  No erythema, edema or ecchymosis  No visible muscle spasm but reported pain and spasm of the paraspinal muscles of the lumbar spine  Strength is 5/5 of the lower extremities bilaterally  Sensation is intact  DTR's distally are symmetrical and intact  Neurological: He has normal strength and normal reflexes  No sensory deficit  He exhibits normal muscle tone  Gait normal    Reflex Scores:       Patellar reflexes are 2+ on the right side and 2+ on the left side  Skin: Skin is warm, dry and intact  Capillary refill takes less than 2 seconds  No bruising and no ecchymosis noted  He is not diaphoretic  No erythema  Psychiatric: He has a normal mood and affect  His behavior is normal    Nursing note and vitals reviewed

## 2019-09-30 NOTE — PATIENT INSTRUCTIONS
Acute Low Back Pain, Ambulatory Care   GENERAL INFORMATION:   Acute low back pain  is discomfort in your lower back area that lasts for less than 12 weeks  The word acute is used to describe pain that starts suddenly, worsens quickly, and lasts for a short time  Common symptoms include the following:   · Back stiffness or spasms    · Pain down the back or side of one leg    · Holding yourself in an unusual position or posture to decrease your back pain    · Not being able to find a sitting position that is comfortable    · Slow increase in your pain for 24 to 48 hours after you stress your back    · Tenderness on your lower back or severe pain when you move your back  Seek immediate care for the following symptoms:   · Severe pain    · Sudden stiffness and heaviness in both buttocks down to both legs    · Numbness or weakness in one leg, or pain in both legs    · Numbness in your genital area or across your lower back    · Unable to control your urine or bowel movements  Treatment for acute low back pain  may include any of the following:  · Medicines:      ¨ NSAIDs  help decrease swelling and pain or fever  This medicine is available with or without a doctor's order  NSAIDs can cause stomach bleeding or kidney problems in certain people  If you take blood thinner medicine, always ask your healthcare provider if NSAIDs are safe for you  Always read the medicine label and follow directions  ¨ Muscle relaxers  help decrease muscle spasms pain  ¨ Prescription pain medicine  may be given  Ask how to take this medicine safely  · Surgery  may be needed if your pain is severe and other treatments do not work  Surgery may be needed for conditions of the lumbar spine, such as herniated disc or spinal stenosis  Manage your symptoms:   · Sleep on a firm mattress  If you do not have a firm mattress, have someone move your mattress to the floor for a few days   A piece of plywood under your mattress can also help make it firmer  · Apply ice  on your lower back for 15 to 20 minutes every hour or as directed  Use an ice pack, or put crushed ice in a plastic bag  Cover it with a towel  Ice helps prevent tissue damage and decreases swelling and pain  You can alternate ice and heat  · Apply heat  on your lower back for 20 to 30 minutes every 2 hours for as many days as directed  Heat helps decrease pain and muscle spasms  · Go to physical therapy  A physical therapist teaches you exercises to help improve movement and strength, and to decrease pain  Prevent acute low back pain:   · Use proper body mechanics  ¨ Bend at the hips and knees when you  objects  Do not bend from the waist  Use your leg muscles as you lift the load  Do not use your back  Keep the object close to your chest as you lift it  Try not to twist or lift anything above your waist     ¨ Change your position often when you stand for long periods of time  Rest one foot on a small box or footrest, and then switch to the other foot often  ¨ Try not to sit for long periods of time  When you do, sit in a straight-backed chair with your feet flat on the floor  Never reach, pull, or push while you are sitting  · Exercise regularly  Warm up before you exercise  Do exercises that strengthen your back muscles  Ask about the best exercise plan for you  · Maintain a healthy weight  Ask your healthcare provider how much you should weigh  Ask him to help you create a weight loss plan if you are overweight  Follow up with your healthcare provider as directed:  Return for a follow-up visit if you still have pain after 1 to 3 weeks of treatment  You may need to visit an orthopedist if your back pain lasts more than 6 to 12 weeks  Write down your questions so you remember to ask them during your visits  CARE AGREEMENT:   You have the right to help plan your care  Learn about your health condition and how it may be treated   Discuss treatment options with your caregivers to decide what care you want to receive  You always have the right to refuse treatment  The above information is an  only  It is not intended as medical advice for individual conditions or treatments  Talk to your doctor, nurse or pharmacist before following any medical regimen to see if it is safe and effective for you  © 2014 [address] is for End User's use only and may not be sold, redistributed or otherwise used for commercial purposes  All illustrations and images included in CareNotes® are the copyrighted property of A D A M , Inc  or [employer]  Lumbago without sciatica:   -We discussed that this appears to be muscular in nature and at this time does not seem to be secondary to herniation or nerve involvement as there is no radiculopathy  Will start the patient on Naproxen 500mg to be taken as prescribed for pain  Take with meals  Will also prescribe cyclobenzaprine 10mg to be taken as needed for muscle spasm  You cannot drive or operate machinery while on this medication    -Ice the area for 20 minutes 3-4 times a day   We discussed he can also attempt warm compress, light massage and stretching   -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

## 2019-10-14 ENCOUNTER — APPOINTMENT (OUTPATIENT)
Dept: RADIOLOGY | Facility: CLINIC | Age: 34
End: 2019-10-14
Payer: COMMERCIAL

## 2019-10-14 ENCOUNTER — OFFICE VISIT (OUTPATIENT)
Dept: OBGYN CLINIC | Facility: CLINIC | Age: 34
End: 2019-10-14
Payer: COMMERCIAL

## 2019-10-14 VITALS
HEART RATE: 60 BPM | BODY MASS INDEX: 26.17 KG/M2 | WEIGHT: 185 LBS | DIASTOLIC BLOOD PRESSURE: 63 MMHG | SYSTOLIC BLOOD PRESSURE: 99 MMHG

## 2019-10-14 DIAGNOSIS — M79.644 FINGER PAIN, RIGHT: ICD-10-CM

## 2019-10-14 DIAGNOSIS — M65.331 TRIGGER MIDDLE FINGER OF RIGHT HAND: Primary | ICD-10-CM

## 2019-10-14 PROCEDURE — 20550 NJX 1 TENDON SHEATH/LIGAMENT: CPT | Performed by: ORTHOPAEDIC SURGERY

## 2019-10-14 PROCEDURE — 99213 OFFICE O/P EST LOW 20 MIN: CPT | Performed by: ORTHOPAEDIC SURGERY

## 2019-10-14 PROCEDURE — 73140 X-RAY EXAM OF FINGER(S): CPT

## 2019-10-14 RX ORDER — TRIAMCINOLONE ACETONIDE 40 MG/ML
20 INJECTION, SUSPENSION INTRA-ARTICULAR; INTRAMUSCULAR
Status: COMPLETED | OUTPATIENT
Start: 2019-10-14 | End: 2019-10-14

## 2019-10-14 RX ORDER — LIDOCAINE HYDROCHLORIDE 5 MG/ML
0.5 INJECTION, SOLUTION INFILTRATION; PERINEURAL
Status: COMPLETED | OUTPATIENT
Start: 2019-10-14 | End: 2019-10-14

## 2019-10-14 RX ADMIN — LIDOCAINE HYDROCHLORIDE 0.5 ML: 5 INJECTION, SOLUTION INFILTRATION; PERINEURAL at 13:04

## 2019-10-14 RX ADMIN — TRIAMCINOLONE ACETONIDE 20 MG: 40 INJECTION, SUSPENSION INTRA-ARTICULAR; INTRAMUSCULAR at 13:04

## 2019-10-14 NOTE — PROGRESS NOTES
Assessment/Plan:  1  Trigger middle finger of right hand  Hand/upper extremity injection: R long A1   2  Finger pain, right  XR finger right third digit-middle       Scribe Attestation    I,:   Lois Gonzalez MA am acting as a scribe while in the presence of the attending physician :        I,:   Raisa Gongora DO personally performed the services described in this documentation    as scribed in my presence :              I discussed with Bri Ame today that his signs and symptoms are consistent with early right long finger trigger finger  He is tender to palpation over the A1 pulley  There is no obvious triggering  Treatment options were discussed in the form of right long finger trigger finger injection  He was agreeable to this and this was performed in the office today without any complications  He may follow up with me as needed  Subjective:   Liza Soliman is a 37 y o  male who presents to the office today for evaluation of right long finger pain  Patient states this has been ongoing for the past week  He denies any known injury  He notes pain to the volar aspect base of the MCP of his right long finger  Patient states he also notes a nodule to the palm  He denies any numbness or tingling  He denies any triggering or locking  Review of Systems   Constitutional: Negative for chills and fever  HENT: Negative for drooling and sneezing  Eyes: Negative for redness  Respiratory: Negative for cough and wheezing  Gastrointestinal: Negative for nausea and vomiting  Musculoskeletal: Positive for arthralgias, joint swelling and myalgias  Neurological: Negative for weakness and numbness  Psychiatric/Behavioral: Negative for behavioral problems  The patient is not nervous/anxious  History reviewed  No pertinent past medical history      Past Surgical History:   Procedure Laterality Date    [place] ARTHROCENTESIS ASPIR&/INJ MAJOR JT/BURSA W/O US Right 7/13/2018    Procedure: Intra-articular hip joint injection;  Surgeon: Genia Morgan MD;  Location[facility] MAIN OR;  Service: Pain Management        Family History   Problem Relation Age of Onset    Diabetes Father     Atrial fibrillation Father        Social History     Occupational History    Not on file   Tobacco Use    Smoking status: Never Smoker    Smokeless tobacco: Never Used   Substance and Sexual Activity    Alcohol use: No    Drug use: No    Sexual activity: Not on file         Current Outpatient Medications:     naproxen (NAPROSYN) 500 mg tablet, Take 1 tablet (500 mg total) by mouth 2 (two) times a day with meals, Disp: 14 tablet, Rfl: 0    No Known Allergies    Objective:  Vitals:    10/14/19 1242   BP: 99/63   Pulse: 60       Ortho Exam     Right long finger    TTP A1 pulley   FDS FD ext intact  Compartments soft  Brisk capillary refill  S/m intact median, radial, and ulnar nerve     Physical Exam   Constitutional: He is oriented to person, place, and time  He appears well-developed and well-nourished  HENT:   Head: Normocephalic and atraumatic  Eyes: Conjunctivae are normal  Right eye exhibits no discharge  Left eye exhibits no discharge  Neck: Normal range of motion  Neck supple  Cardiovascular: Normal rate and intact distal pulses  Pulmonary/Chest: Effort normal  No respiratory distress  Musculoskeletal:   As noted in HPI   Neurological: He is alert and oriented to person, place, and time  Skin: Skin is warm and dry  Psychiatric: He has a normal mood and affect   His behavior is normal  Judgment and thought content normal      Hand/upper extremity injection: R long A1  Date/Time: 10/14/2019 1:04 PM  Consent given by: patient  Site marked: site marked  Timeout: Immediately prior to procedure a time out was called to verify the correct patient, procedure, equipment, support staff and site/side marked as required   Supporting Documentation  Indications: pain   Procedure Details  Condition:trigger finger Location: long finger - R long A1   Preparation: Patient was prepped and draped in the usual sterile fashion  Needle size: 27 G  Ultrasound guidance: no  Medications administered: 0 5 mL lidocaine 0 5 %; 20 mg triamcinolone acetonide 40 mg/mL    Patient tolerance: patient tolerated the procedure well with no immediate complications  Dressing:  Sterile dressing applied           I have personally reviewed pertinent films in PACS and my interpretation is as follows:X-ray right long finger performed in the office today demonstrates no fractures or dislocations

## 2020-06-11 NOTE — TELEPHONE ENCOUNTER
3rd attempt   Lm on Vm w/cb#     Please send letter Last OV: 9/27/19 annual PE    Future Appointments   Date Time Provider Nery Cat   6/12/2020 10:00 AM SANDY Burrows [facility]   6/17/2020 10:00 AM Shan Trevino MD [facility]   7/27/2020 10:15 AM Osbaldo Roberts MD [facility]

## 2020-08-07 ENCOUNTER — TELEPHONE (OUTPATIENT)
Dept: OBGYN CLINIC | Facility: CLINIC | Age: 35
End: 2020-08-07

## 2020-08-07 DIAGNOSIS — M79.644 FINGER PAIN, RIGHT: Primary | ICD-10-CM

## 2020-08-07 NOTE — TELEPHONE ENCOUNTER
----- Message from Niraj Mode, DO sent at 8/7/2020  9:18 AM EDT -----  Please call patient to determine finger injury and please order XR of hand of affected side  He has a VV for next week

## 2020-08-10 ENCOUNTER — APPOINTMENT (OUTPATIENT)
Dept: RADIOLOGY | Facility: CLINIC | Age: 35
End: 2020-08-10
Payer: COMMERCIAL

## 2020-08-10 DIAGNOSIS — M79.644 FINGER PAIN, RIGHT: ICD-10-CM

## 2020-08-10 PROCEDURE — 73140 X-RAY EXAM OF FINGER(S): CPT

## 2020-08-13 ENCOUNTER — OFFICE VISIT (OUTPATIENT)
Dept: OBGYN CLINIC | Facility: CLINIC | Age: 35
End: 2020-08-13
Payer: COMMERCIAL

## 2020-08-13 VITALS
WEIGHT: 185 LBS | HEART RATE: 69 BPM | DIASTOLIC BLOOD PRESSURE: 91 MMHG | SYSTOLIC BLOOD PRESSURE: 143 MMHG | TEMPERATURE: 99.1 F | HEIGHT: 71 IN | BODY MASS INDEX: 25.9 KG/M2

## 2020-08-13 DIAGNOSIS — S62.656A CLOSED NONDISPLACED FRACTURE OF MIDDLE PHALANX OF RIGHT LITTLE FINGER, INITIAL ENCOUNTER: Primary | ICD-10-CM

## 2020-08-13 PROCEDURE — 99213 OFFICE O/P EST LOW 20 MIN: CPT | Performed by: ORTHOPAEDIC SURGERY

## 2020-08-13 NOTE — PROGRESS NOTES
Assessment/Plan:  1  Closed nondisplaced fracture of middle phalanx of right little finger, initial encounter         Scribe Attestation    I,:   Lois Gonzalez MA am acting as a scribe while in the presence of the attending physician :        I,:   Susie Pierce DO personally performed the services described in this documentation    as scribed in my presence :              I discussed with Verónica Beltrán that x-rays demonstrate a middle phalanx fracture  This did occur 4 months ago  The PIP joint is stable on exam  Treatment options were discussed in the form of a HEP for aggressive range of motion  He has no restrictions at this time  We did also discuss a steroid injection however, he deferred at this time as he states it not very bothersome for him  He may follow up with me as needed  Subjective:   Viola Sullivan is a 38 y o  male who presents to the office today for evaluation of right small finger pain  Patient states in April he was installing a deck when he jammed his small finger  Patient states he had normal range of motion after the injury  He notes immediate swelling at the PIP joint  He denies any dislocation  Patient states he was using ice and continued to use the finger  Patient notes continued stiffness about the finger  He notes tightness about the PIP joint  He denies any numbness or tingling  Review of Systems   Constitutional: Negative for chills and fever  HENT: Negative for drooling and sneezing  Eyes: Negative for redness  Respiratory: Negative for cough and wheezing  Gastrointestinal: Negative for nausea and vomiting  Musculoskeletal: Negative for arthralgias, joint swelling and myalgias  Neurological: Negative for weakness and numbness  Psychiatric/Behavioral: Negative for behavioral problems  The patient is not nervous/anxious  History reviewed  No pertinent past medical history      Past Surgical History:   Procedure Laterality Date    [place] ARTHROCENTESIS ASPIR&/INJ MAJOR JT/BURSA W/O US Right 7/13/2018    Procedure: Intra-articular hip joint injection;  Surgeon: Davey Rios MD;  Location: [facility] MAIN OR;  Service: Pain Management        Family History   Problem Relation Age of Onset    Diabetes Father     Atrial fibrillation Father        Social History     Occupational History    Not on file   Tobacco Use    Smoking status: Never Smoker    Smokeless tobacco: Never Used   Substance and Sexual Activity    Alcohol use: No    Drug use: No    Sexual activity: Not on file       No current outpatient medications on file  No Known Allergies    Objective:  Vitals:    08/13/20 1413   BP: 143/91   Pulse: 69   Temp: 99 1 °F (37 3 °C)       Ortho Exam     Right small finger    No ext lag DIP and PIP  FDS FDP ext intact  PIP stable at 0 and 30  Mild TTP volar aspect PIP  Mild swelling about PIP  Compartments soft  Brisk capillary refill  S/m intact median, radial, and ulnar nerve     Physical Exam  Constitutional:       Appearance: He is well-developed  HENT:      Head: Normocephalic and atraumatic  Eyes:      General:         Right eye: No discharge  Left eye: No discharge  Conjunctiva/sclera: Conjunctivae normal    Neck:      Musculoskeletal: Normal range of motion and neck supple  Cardiovascular:      Rate and Rhythm: Normal rate  Pulmonary:      Effort: Pulmonary effort is normal  No respiratory distress  Musculoskeletal:      Comments: As noted in HPI   Skin:     General: Skin is warm and dry  Neurological:      Mental Status: He is alert and oriented to person, place, and time  Psychiatric:         Behavior: Behavior normal          Thought Content: Thought content normal          Judgment: Judgment normal          I have personally reviewed pertinent films in PACS and my interpretation is as follows:X-ray right small finger performed on 8/10/20 demonstrates middle phalanx fracture

## 2020-12-20 ENCOUNTER — IMMUNIZATIONS (OUTPATIENT)
Dept: FAMILY MEDICINE CLINIC | Facility: HOSPITAL | Age: 35
End: 2020-12-20
Payer: COMMERCIAL

## 2020-12-20 DIAGNOSIS — Z23 ENCOUNTER FOR IMMUNIZATION: ICD-10-CM

## 2020-12-20 PROCEDURE — 0001A SARS-COV-2 / COVID-19 MRNA VACCINE (PFIZER-BIONTECH) 30 MCG: CPT

## 2020-12-20 PROCEDURE — 91300 SARS-COV-2 / COVID-19 MRNA VACCINE (PFIZER-BIONTECH) 30 MCG: CPT

## 2021-01-10 ENCOUNTER — IMMUNIZATIONS (OUTPATIENT)
Dept: FAMILY MEDICINE CLINIC | Facility: HOSPITAL | Age: 36
End: 2021-01-10

## 2021-01-10 DIAGNOSIS — Z23 ENCOUNTER FOR IMMUNIZATION: ICD-10-CM

## 2021-01-10 PROCEDURE — 91300 SARS-COV-2 / COVID-19 MRNA VACCINE (PFIZER-BIONTECH) 30 MCG: CPT

## 2021-01-10 PROCEDURE — 0002A SARS-COV-2 / COVID-19 MRNA VACCINE (PFIZER-BIONTECH) 30 MCG: CPT

## 2021-03-23 ENCOUNTER — OFFICE VISIT (OUTPATIENT)
Dept: PODIATRY | Facility: CLINIC | Age: 36
End: 2021-03-23
Payer: COMMERCIAL

## 2021-03-23 VITALS — HEIGHT: 71 IN | RESPIRATION RATE: 17 BRPM | BODY MASS INDEX: 25.9 KG/M2 | WEIGHT: 185 LBS

## 2021-03-23 DIAGNOSIS — M76.62 ACHILLES TENDINITIS OF LEFT LOWER EXTREMITY: ICD-10-CM

## 2021-03-23 DIAGNOSIS — M21.41 ACQUIRED FLAT FOOT, RIGHT: ICD-10-CM

## 2021-03-23 DIAGNOSIS — M21.962 ACQUIRED DEFORMITY OF LEFT FOOT: ICD-10-CM

## 2021-03-23 DIAGNOSIS — M21.42 ACQUIRED FLAT FOOT, LEFT: ICD-10-CM

## 2021-03-23 DIAGNOSIS — M21.961 ACQUIRED DEFORMITY OF RIGHT FOOT: Primary | ICD-10-CM

## 2021-03-23 PROCEDURE — 73620 X-RAY EXAM OF FOOT: CPT | Performed by: PODIATRIST

## 2021-03-23 PROCEDURE — 99202 OFFICE O/P NEW SF 15 MIN: CPT | Performed by: PODIATRIST

## 2021-03-23 PROCEDURE — L3000 FT INSERT UCB BERKELEY SHELL: HCPCS | Performed by: PODIATRIST

## 2021-03-23 RX ORDER — MELOXICAM 7.5 MG/1
7.5 TABLET ORAL DAILY
Qty: 10 TABLET | Refills: 0 | Status: SHIPPED | OUTPATIENT
Start: 2021-03-23 | End: 2021-04-02

## 2021-03-23 NOTE — PROGRESS NOTES
Assessment/Plan:   deformity of foot  Pronation syndrome  Equines deformity bilateral   Achilles tendinitis left foot  Plan  Foot exam performed  Patient educated on condition  X-rays taken  We will start home stretching program   Patient be placed on Mobic  His feet have been casted for custom molded foot orthotics       Diagnoses and all orders for this visit:    Acquired deformity of right foot    Acquired deformity of left foot    Acquired flat foot, right    Acquired flat foot, left    Achilles tendinitis of left lower extremity  -     meloxicam (MOBIC) 7 5 mg tablet; Take 1 tablet (7 5 mg total) by mouth daily for 10 days          Subjective:  Patient presents for evaluation of a painful left heel  This has been ongoing for several months  No history of trauma    No Known Allergies      Current Outpatient Medications:     meloxicam (MOBIC) 7 5 mg tablet, Take 1 tablet (7 5 mg total) by mouth daily for 10 days, Disp: 10 tablet, Rfl: 0    Patient Active Problem List   Diagnosis    Neuropraxia of left upper extremity    Sprain of shoulder, left    Pain in right hip    Tear of right acetabular labrum    Acute midline low back pain without sciatica          Patient ID: Alicia Ascencio is a 43 y o  male  HPI    The following portions of the patient's history were reviewed and updated as appropriate:     family history includes Atrial fibrillation in his father; Diabetes in his father  reports that he has never smoked  He has never used smokeless tobacco  He reports that he does not drink alcohol or use drugs  Vitals:    03/23/21 1325   Resp: 17       Review of Systems      Objective:  Patient's shoes and socks removed     Foot Exam    General  General Appearance: appears stated age and healthy   Orientation: alert and oriented to person, place, and time   Affect: appropriate   Gait: antalgic       Right Foot/Ankle     Inspection and Palpation  Ecchymosis: none  Swelling: none   Arch: pes planus  Hallux valgus: no    Neurovascular  Dorsalis pedis: 3+  Posterior tibial: 3+    Range of Motion    Passive  Ankle dorsiflexion: 0        Left Foot/Ankle      Inspection and Palpation  Ecchymosis: none  Tenderness: bony tenderness and calcaneus tenderness   Swelling: none   Arch: pes planus  Hallux valgus: no    Neurovascular  Dorsalis pedis: 3+  Posterior tibial: 3+    Range of Motion    Passive  Ankle dorsiflexion: 0          Physical Exam  Vitals signs and nursing note reviewed  Constitutional:       Appearance: Normal appearance  Cardiovascular:      Rate and Rhythm: Normal rate and regular rhythm  Pulses:           Dorsalis pedis pulses are 3+ on the right side and 3+ on the left side  Posterior tibial pulses are 3+ on the right side and 3+ on the left side  Musculoskeletal:      Right foot: No bunion  Left foot: Bony tenderness present  No bunion  Comments:  Patient is pronated in stance and gait  He has equines deformity bilateral   There is some pain with palpation of left Achilles tendon insertion  X-ray  No evidence of fracture or bony mass   Skin:     Capillary Refill: Capillary refill takes less than 2 seconds  Neurological:      Mental Status: He is alert  Psychiatric:         Mood and Affect: Mood normal          Behavior: Behavior normal          Thought Content:  Thought content normal          Judgment: Judgment normal

## 2021-09-25 ENCOUNTER — IMMUNIZATIONS (OUTPATIENT)
Dept: FAMILY MEDICINE CLINIC | Facility: HOSPITAL | Age: 36
End: 2021-09-25

## 2021-09-25 DIAGNOSIS — Z23 ENCOUNTER FOR IMMUNIZATION: Primary | ICD-10-CM

## 2021-09-25 PROCEDURE — 91300 SARS-COV-2 / COVID-19 MRNA VACCINE (PFIZER-BIONTECH) 30 MCG: CPT

## 2021-09-25 PROCEDURE — 0001A SARS-COV-2 / COVID-19 MRNA VACCINE (PFIZER-BIONTECH) 30 MCG: CPT

## 2022-06-08 ENCOUNTER — TELEPHONE (OUTPATIENT)
Dept: SLEEP CENTER | Facility: CLINIC | Age: 37
End: 2022-06-08

## 2022-09-02 ENCOUNTER — OFFICE VISIT (OUTPATIENT)
Dept: URGENT CARE | Facility: CLINIC | Age: 37
End: 2022-09-02
Payer: COMMERCIAL

## 2022-09-02 ENCOUNTER — APPOINTMENT (OUTPATIENT)
Dept: RADIOLOGY | Facility: CLINIC | Age: 37
End: 2022-09-02
Payer: COMMERCIAL

## 2022-09-02 VITALS
RESPIRATION RATE: 18 BRPM | SYSTOLIC BLOOD PRESSURE: 108 MMHG | DIASTOLIC BLOOD PRESSURE: 68 MMHG | HEIGHT: 70 IN | WEIGHT: 188 LBS | BODY MASS INDEX: 26.92 KG/M2 | OXYGEN SATURATION: 100 % | TEMPERATURE: 97.6 F | HEART RATE: 86 BPM

## 2022-09-02 DIAGNOSIS — M79.672 LEFT FOOT PAIN: Primary | ICD-10-CM

## 2022-09-02 DIAGNOSIS — M79.672 LEFT FOOT PAIN: ICD-10-CM

## 2022-09-02 PROCEDURE — 99213 OFFICE O/P EST LOW 20 MIN: CPT | Performed by: PHYSICIAN ASSISTANT

## 2022-09-02 PROCEDURE — 73630 X-RAY EXAM OF FOOT: CPT

## 2022-09-02 NOTE — PROGRESS NOTES
[facility]        NAME: Elvin Spencer is a 42 y o  male  : 1985    MRN: [id number]  DATE: 2022  TIME: 3:19 PM    Assessment and Plan   Left foot pain [M79 672]  1  Left foot pain  XR foot 3+ vw left     XR WNL  Likely some sort of tendinitis/fasciitis  Recommend rest, elevation, moist heat  Discussed strict return to care precautions as well as red flag symptoms which should prompt immediate ED referral  Pt verbalized understanding and is in agreement with plan  Please follow up with your primary care provider within the next week  Please remember that your visit today was with an urgent care provider and should not replace follow up with your primary care provider for chronic medical issues or annual physicals  Patient Instructions       Follow up with PCP in 3-5 days  Proceed to  ER if symptoms worsen  Chief Complaint     Chief Complaint   Patient presents with    Foot Pain     Pt here for  left foot pain  x2 weeks no injury but  some over use, Pt used massage, ice and Advil  History of Present Illness       Pt is a 43 yo male with no reported pmh pw atraumatic L foot pain x 2 wks  Believes it's related to overuse but wants to r/o stress fracture  Pain is intermittent  No numbness, tingling, skin changes  No issues with weight bearing  Has tried ice, massage, heat with minimal improvement  Review of Systems   Review of Systems   Constitutional: Negative for chills and fever  Respiratory: Negative for shortness of breath  Cardiovascular: Negative for chest pain  Gastrointestinal: Negative for nausea and vomiting  Musculoskeletal: Negative for arthralgias, back pain, gait problem, joint swelling, myalgias and neck pain  Skin: Negative for color change and wound  Neurological: Negative for weakness and numbness  Current Medications     No current outpatient medications on file      Current Allergies     Allergies as of 2022    (No Known Allergies)            The following portions of the patient's history were reviewed and updated as appropriate: allergies, current medications, past family history, past medical history, past social history, past surgical history and problem list      Past Medical History:   Diagnosis Date    Patient denies medical problems        Past Surgical History:   Procedure Laterality Date    [place] ARTHROCENTESIS ASPIR&/INJ MAJOR JT/BURSA W/O US Right 7/13/2018    Procedure: Intra-articular hip joint injection;  Surgeon: Alysa Castillo MD;  Location: [facility] MAIN OR;  Service: Pain Management        Family History   Problem Relation Age of Onset    Diabetes Father     Atrial fibrillation Father          Medications have been verified  Objective   /68   Pulse 86   Temp 97 6 °F (36 4 °C)   Resp 18   Ht 5' 10" (1 778 m)   Wt 85 3 kg (188 lb)   SpO2 100%   BMI 26 98 kg/m²        Physical Exam     Physical Exam  Vitals and nursing note reviewed  Constitutional:       General: He is not in acute distress  Appearance: Normal appearance  He is not ill-appearing  HENT:      Head: Normocephalic and atraumatic  Cardiovascular:      Rate and Rhythm: Normal rate  Pulmonary:      Effort: Pulmonary effort is normal  No respiratory distress  Musculoskeletal:      Right ankle: Normal       Left ankle: Normal       Right foot: Normal range of motion and normal capillary refill  No swelling, tenderness or bony tenderness  Normal pulse  Left foot: Normal range of motion and normal capillary refill  No swelling, tenderness or bony tenderness  Normal pulse  Skin:     General: Skin is warm and dry  Capillary Refill: Capillary refill takes less than 2 seconds  Neurological:      Mental Status: He is alert and oriented to person, place, and time     Psychiatric:         Behavior: Behavior normal

## 2022-09-14 ENCOUNTER — HOSPITAL ENCOUNTER (OUTPATIENT)
Dept: SLEEP CENTER | Facility: CLINIC | Age: 37
Discharge: HOME | End: 2022-09-14

## 2022-09-14 DIAGNOSIS — R06.83 SNORING: ICD-10-CM

## 2022-09-14 DIAGNOSIS — G47.10 PERSISTENT DISORDER OF INITIATING OR MAINTAINING WAKEFULNESS: ICD-10-CM

## 2022-09-14 NOTE — PROGRESS NOTES
Home Sleep Study Documentation    HOME STUDY DEVICE: Noxturnal yes                                           Yanci G3 no      Pre-Sleep Home Study:    Set-up and instructions performed by: EDGAR Mitchell, [facility]    Technician performed demonstration for Patient: yes    Return demonstration performed by Patient: yes    Written instructions provided to Patient: yes    Patient signed consent form: yes        Post-Sleep Home Study:    Additional comments by Patient: pending    Home Sleep Study Failed:pending    Failure reason: pending    Reported or Detected: pending    Scored by: pending

## 2022-09-26 ENCOUNTER — HOSPITAL ENCOUNTER (OUTPATIENT)
Dept: SLEEP CENTER | Facility: CLINIC | Age: 37
Discharge: HOME | End: 2022-09-26
Payer: COMMERCIAL

## 2022-09-26 PROCEDURE — G0399 HOME SLEEP TEST/TYPE 3 PORTA: HCPCS

## 2022-09-29 NOTE — PROGRESS NOTES
Home Sleep Study Documentation    HOME STUDY DEVICE: Noxturnal no                                           Yanci G3 yes      Pre-Sleep Home Study:    Set-up and instructions performed by: JULIANA Collazo RPSGT    Technician performed demonstration for Patient: yes    Return demonstration performed by Patient: yes    Written instructions provided to Patient: yes    Patient signed consent form: yes        Post-Sleep Home Study:    Additional comments by Patient:       Home Sleep Study Failed:no:    Failure reason: N/A    Reported or Detected: N/A    Scored by: JULIANA Collazo RPSGT

## 2022-10-05 ENCOUNTER — TELEPHONE (OUTPATIENT)
Dept: SLEEP CENTER | Facility: CLINIC | Age: 37
End: 2022-10-05

## 2022-10-05 NOTE — TELEPHONE ENCOUNTER
Notified the patient that Sleep study shows mild RAJEEV   Per order Dr John Willoughby to follow up   Otolaryngology    [phone]

## 2024-01-26 ENCOUNTER — OFFICE VISIT (OUTPATIENT)
Dept: OBGYN CLINIC | Facility: CLINIC | Age: 39
End: 2024-01-26
Payer: COMMERCIAL

## 2024-01-26 ENCOUNTER — APPOINTMENT (OUTPATIENT)
Dept: RADIOLOGY | Facility: CLINIC | Age: 39
End: 2024-01-26
Payer: COMMERCIAL

## 2024-01-26 VITALS
WEIGHT: 190 LBS | HEART RATE: 71 BPM | SYSTOLIC BLOOD PRESSURE: 132 MMHG | HEIGHT: 70 IN | BODY MASS INDEX: 27.2 KG/M2 | DIASTOLIC BLOOD PRESSURE: 71 MMHG

## 2024-01-26 DIAGNOSIS — M79.672 PAIN IN LEFT FOOT: ICD-10-CM

## 2024-01-26 DIAGNOSIS — M76.72 PERONEAL TENDINITIS OF LEFT LOWER LEG: Primary | ICD-10-CM

## 2024-01-26 PROCEDURE — 73630 X-RAY EXAM OF FOOT: CPT

## 2024-01-26 PROCEDURE — 99214 OFFICE O/P EST MOD 30 MIN: CPT | Performed by: ORTHOPAEDIC SURGERY

## 2024-01-26 NOTE — PROGRESS NOTES
Assessment/Plan:  1. Peroneal tendinitis of left lower leg  XR foot 3+ vw left          Richard has no pain on examination today but does have a os peroneum on x-ray which may be related to his lateral foot pain.  It sounds like he was experiencing peroneal tendinitis of the left lower leg and may have either a subluxing peroneal tendon or irritation of the os peroneum at the insertion of the tendon.  In any event, this does seem to be getting better with conservative measures and anti-inflammatories if he ever experiences this pain again.  If he has continuous discomfort or worsening symptoms we could consider further imaging with ultrasound or MRI if clinically indicated.    Subjective:   Richard Cantu is a 39 y.o. male who presents to the office for evaluation for left foot and ankle pain.  He states about a week ago he was doing a lot of exercise and jump roping and felt an increased pain after the exercise.  He felt a sharp stabbing pain in the lateral portion of the ankle and foot.  The pain resolved and he has no pain today.  He states this happened about 2 years ago and resolved spontaneously as well.  He just wants to make sure nothing is wrong in his foot.      Review of Systems   Constitutional:  Negative for chills, fever and unexpected weight change.   HENT:  Negative for hearing loss, nosebleeds and sore throat.    Eyes:  Negative for pain, redness and visual disturbance.   Respiratory:  Negative for cough, shortness of breath and wheezing.    Cardiovascular:  Negative for chest pain, palpitations and leg swelling.   Gastrointestinal:  Negative for abdominal pain, nausea and vomiting.   Endocrine: Negative for polyphagia and polyuria.   Genitourinary:  Negative for dysuria and hematuria.   Musculoskeletal:         See HPI   Skin:  Negative for rash and wound.   Neurological:  Negative for dizziness, numbness and headaches.   Psychiatric/Behavioral:  Negative for decreased concentration and suicidal  ideas. The patient is not nervous/anxious.          Past Medical History:   Diagnosis Date    Patient denies medical problems        Past Surgical History:   Procedure Laterality Date    [place] ARTHROCENTESIS ASPIR&/INJ MAJOR JT/BURSA W/O US Right 7/13/2018    Procedure: Intra-articular hip joint injection;  Surgeon: Margarito Lobato MD;  Location: [facility] MAIN OR;  Service: Pain Management        Family History   Problem Relation Age of Onset    Diabetes Father     Atrial fibrillation Father        Social History     Occupational History    Not on file   Tobacco Use    Smoking status: Never    Smokeless tobacco: Never   Vaping Use    Vaping status: Never Used   Substance and Sexual Activity    Alcohol use: Yes    Drug use: Never    Sexual activity: Not on file       No current outpatient medications on file.    No Known Allergies    Objective:  Vitals:    01/26/24 1313   BP: 132/71   Pulse: 71     Pain Score: 0-No pain      Left Ankle Exam     Tenderness   The patient is experiencing no tenderness.   Swelling: none    Range of Motion   Dorsiflexion:  normal   Plantar flexion:  normal   Eversion:  normal   Inversion:  normal     Muscle Strength   Dorsiflexion:  5/5   Plantar flexion:  5/5   Anterior tibial:  5/5   Posterior tibial:  5/5  Gastrocsoleus:  5/5  Peroneal muscle:  5/5    Tests   Anterior drawer: negative    Other   Erythema: absent  Sensation: normal  Pulse: present          Strength/Myotome Testing     Left Ankle/Foot   Dorsiflexion: 5  Plantar flexion: 5      Physical Exam  Vitals reviewed.   Constitutional:       Appearance: He is well-developed.   HENT:      Head: Normocephalic and atraumatic.   Eyes:      Conjunctiva/sclera: Conjunctivae normal.      Pupils: Pupils are equal, round, and reactive to light.   Cardiovascular:      Rate and Rhythm: Normal rate.      Pulses: Normal pulses.   Pulmonary:      Effort: Pulmonary effort is normal. No respiratory distress.   Musculoskeletal:      Cervical back:  Normal range of motion and neck supple.      Comments: As noted in HPI   Skin:     General: Skin is warm and dry.   Neurological:      General: No focal deficit present.      Mental Status: He is alert and oriented to person, place, and time.   Psychiatric:         Mood and Affect: Mood normal.         Behavior: Behavior normal.         I have personally reviewed pertinent films in PACS and my interpretation is as follows:  Left foot x-rays today demonstrate no evidence of acute fracture.  Os peroneum present over lateral left foot similar to previous foot x-ray near patient's site of pain      This document was created using speech voice recognition software.   Grammatical errors, random word insertions, pronoun errors, and incomplete sentences are an occasional consequence of this system due to software limitations, ambient noise, and hardware issues.   Any formal questions or concerns about content, text, or information contained within the body of this dictation should be directly addressed to the provider for clarification.

## 2024-10-02 ENCOUNTER — OFFICE VISIT (OUTPATIENT)
Dept: OBGYN CLINIC | Facility: CLINIC | Age: 39
End: 2024-10-02
Payer: COMMERCIAL

## 2024-10-02 ENCOUNTER — APPOINTMENT (OUTPATIENT)
Dept: RADIOLOGY | Facility: CLINIC | Age: 39
End: 2024-10-02
Payer: COMMERCIAL

## 2024-10-02 VITALS
DIASTOLIC BLOOD PRESSURE: 74 MMHG | BODY MASS INDEX: 27.2 KG/M2 | WEIGHT: 190 LBS | SYSTOLIC BLOOD PRESSURE: 120 MMHG | HEIGHT: 70 IN | HEART RATE: 64 BPM

## 2024-10-02 DIAGNOSIS — M79.641 BILATERAL HAND PAIN: Primary | ICD-10-CM

## 2024-10-02 DIAGNOSIS — M79.641 BILATERAL HAND PAIN: ICD-10-CM

## 2024-10-02 DIAGNOSIS — M65.352 TRIGGER LITTLE FINGER OF LEFT HAND: ICD-10-CM

## 2024-10-02 DIAGNOSIS — M79.642 BILATERAL HAND PAIN: ICD-10-CM

## 2024-10-02 DIAGNOSIS — M79.642 BILATERAL HAND PAIN: Primary | ICD-10-CM

## 2024-10-02 DIAGNOSIS — M65.331 TRIGGER MIDDLE FINGER OF RIGHT HAND: ICD-10-CM

## 2024-10-02 PROCEDURE — 20550 NJX 1 TENDON SHEATH/LIGAMENT: CPT | Performed by: STUDENT IN AN ORGANIZED HEALTH CARE EDUCATION/TRAINING PROGRAM

## 2024-10-02 PROCEDURE — 73130 X-RAY EXAM OF HAND: CPT

## 2024-10-02 PROCEDURE — 99214 OFFICE O/P EST MOD 30 MIN: CPT | Performed by: STUDENT IN AN ORGANIZED HEALTH CARE EDUCATION/TRAINING PROGRAM

## 2024-10-02 RX ORDER — BETAMETHASONE SODIUM PHOSPHATE AND BETAMETHASONE ACETATE 3; 3 MG/ML; MG/ML
6 INJECTION, SUSPENSION INTRA-ARTICULAR; INTRALESIONAL; INTRAMUSCULAR; SOFT TISSUE
Status: COMPLETED | OUTPATIENT
Start: 2024-10-02 | End: 2024-10-02

## 2024-10-02 RX ORDER — LIDOCAINE HYDROCHLORIDE 10 MG/ML
1 INJECTION, SOLUTION INFILTRATION; PERINEURAL
Status: COMPLETED | OUTPATIENT
Start: 2024-10-02 | End: 2024-10-02

## 2024-10-02 RX ADMIN — LIDOCAINE HYDROCHLORIDE 1 ML: 10 INJECTION, SOLUTION INFILTRATION; PERINEURAL at 15:30

## 2024-10-02 RX ADMIN — BETAMETHASONE SODIUM PHOSPHATE AND BETAMETHASONE ACETATE 6 MG: 3; 3 INJECTION, SUSPENSION INTRA-ARTICULAR; INTRALESIONAL; INTRAMUSCULAR; SOFT TISSUE at 15:30

## 2024-10-02 NOTE — PROGRESS NOTES
ORTHOPAEDIC HAND, WRIST, AND ELBOW OFFICE  VISIT     ASSESSMENT/PLAN:    Richard Cantu is a 39 y.o. RHD male who presents with left small finger and right long finger trigger fingers s/p CSI 10/2/24    - - We discussed the natural history and etiology of this condition detail. We discussed the utility of therapy vs. injection vs. surgery. In this case, the risks of surgery involve infection, persistent pain, and recurrence of the condition.       -  The patient elected to receive a steroid injection as per procedure note below.  Of note this is his second injection to his right long finger, last CSI provided roughly 4 years of relief.     - The patient will follow up with us on a as needed basis should the symptoms worsen or persist.                 The patient verbalized understanding of exam findings and treatment plan. We engaged in the shared decision-making process and treatment options were discussed at length with the patient. Surgical and conservative management discussed today along with risks and benefits.    Follow Up:  As needed             Trigger FInger: The anatomy and physiology of trigger finger was discussed with the patient today in the office.  Edema and increased contact pressure within the flexor tendons at the A1 pulley can cause pain, crepitation, and limitation of function.  Treatment options include resting MP blocking splints to decrease edema, oral anti-inflammatory medications, home or formal therapy exercises, up to 2 steroid injections within the tendon sheath, or surgical release.  While majority of patients do respond to conservative treatment, up to 20% may require surgical release.         ____________________________________________________________________________________________________________________________________________      CHIEF COMPLAINT:  Bilateral hand pain    SUBJECTIVE:  Richard Cantu is a 39 y.o. male who presents with recurrent right long finger trigger and new  "left small finger pain. Patient says previous episode of trigger finger was in 2019 in which he saw Dr. Munoz who provided him with a CSI. He states after last injection he had complete resolution of his symptoms for 4 years. Over the last few months he states he has started to feel recurrence of the pain he felt in 2019, thinks it was exacerbated by strain while playing with his children. Otherwise denies numbness or tingling to digits. No active mechanical symptoms, but pain in palm of right long and left small finger, worse with gripping improved with rest. No other complaints.    I have personally reviewed all the relevant PMH, PSH, SH, FH, Medications and allergies      PAST MEDICAL HISTORY:  Past Medical History:   Diagnosis Date    Patient denies medical problems        PAST SURGICAL HISTORY:  Past Surgical History:   Procedure Laterality Date    HIP SURGERY  2019    [place] ARTHROCENTESIS ASPIR&/INJ MAJOR JT/BURSA W/O US Right 07/13/2018    Procedure: Intra-articular hip joint injection;  Surgeon: Margarito Lobato MD;  Location: [facility] MAIN OR;  Service: Pain Management     TENDON REPAIR  2019       FAMILY HISTORY:  Family History   Problem Relation Age of Onset    Diabetes Father     Atrial fibrillation Father        SOCIAL HISTORY:  Social History     Tobacco Use    Smoking status: Never    Smokeless tobacco: Never   Vaping Use    Vaping status: Never Used   Substance Use Topics    Alcohol use: Yes     Comment: occ.    Drug use: No       MEDICATIONS:  No current outpatient medications on file.    ALLERGIES:  No Known Allergies        REVIEW OF SYSTEMS:  Pertinent items are noted in HPI.  A comprehensive review of systems was negative.    VITALS:  Vitals:    10/02/24 1529   BP: 120/74   Pulse: 64       LABS:  HgA1c: No results found for: \"HGBA1C\"  BMP: No results found for: \"GLUCOSE\", \"CALCIUM\", \"NA\", \"K\", \"CO2\", \"CL\", \"BUN\", \"CREATININE\"    _____________________________________________________  PHYSICAL " EXAMINATION:  General: well developed and well nourished, alert, oriented times 3, and appears comfortable  Psychiatric: Normal  HEENT: Normocephalic, Atraumatic Trachea Midline, No torticollis  Pulmonary: No audible wheezing or respiratory distress   Abdomen/GI: Non tender, non distended   Cardiovascular: No pitting edema, 2+ radial pulse   Skin: No masses, erythema, lacerations, fluctation, ulcerations  Neurovascular: Sensation Intact to the Median, Ulnar, Radial Nerve, Motor Intact to the Median, Ulnar, Radial Nerve, and Pulses Intact  Musculoskeletal: Normal, except as noted in detailed exam and in HPI.        FOCUSED MUSCULOSKELETAL EXAMINATION:  Right Upper Extremity  Inspection: skin intact, no notable deformity   Palpation:  ttp about right long finger A1 pulley  Neurologic:  5/5 elbow flexion, 5/5 elbow extension, 5/5 wrist extension, 5/5 wrist flexion, 5/5 finger flexion, 5/5 finger extension, 5/5 FPL, 5/5 EPL, 5/5 APB, 5/5 intrinsics, sensation intact to median, radial, and ulnar nerve distributions  Vascular: Palpable radial pulse, brisk cap refill <2sec, hand warm and well perfused  MSK:   Mild clicking through ROM of right long finger, no active locking     Left Upper Extremity  Inspection: skin intact, no notable deformity   Palpation:  ttp about left small finger A1 pulley  Neurologic:  5/5 elbow flexion, 5/5 elbow extension, 5/5 wrist extension, 5/5 wrist flexion, 5/5 finger flexion, 5/5 finger extension, 5/5 FPL, 5/5 EPL, 5/5 APB, 5/5 intrinsics, sensation intact to median, radial, and ulnar nerve distributions  Vascular: Palpable radial pulse, brisk cap refill <2sec, hand warm and well perfused  MSK:   Mild clicking through ROM of left small finger, no active locking   ___________________________________________________  STUDIES REVIEWED:  Xrays of the bilateral hands were obtained on 10/2/2024 were independently reviewed which demonstrates no acute fracture or dislocation        LABS  "REVIEWED:    HgA1c: No results found for: \"HGBA1C\"  BMP: No results found for: \"GLUCOSE\", \"CALCIUM\", \"NA\", \"K\", \"CO2\", \"CL\", \"BUN\", \"CREATININE\"            PROCEDURES PERFORMED:  Hand/upper extremity injection  Universal Protocol:  procedure performed by consultantConsent: Verbal consent obtained.  Risks and benefits: risks, benefits and alternatives were discussed  Consent given by: patient  Time out: Immediately prior to procedure a \"time out\" was called to verify the correct patient, procedure, equipment, support staff and site/side marked as required.  Patient identity confirmed: verbally with patient  Supporting Documentation  Indications: pain   Procedure Details  Condition:trigger finger Needle size: 25 G  Ultrasound guidance: no  Medications administered: 1 mL lidocaine 1 %; 6 mg betamethasone acetate-betamethasone sodium phosphate 6 (3-3) mg/mL    Dx: right long finger trigger finger    PROCEDURE: A timeout was performed in which the side, site and laterality were confirmed by all present. After informed consent was obtained under sterile conditions a combination of 1% lidocaine and 6 mg betamethasone acetate-betamethasone sodium phosphate 6 (3-3) mg/mL were combined in a 1:1 mixture for a total of 2 mL. The area of injection was cleansed with an alcohol preparation and the right long finger A1 pulley was subsequently injected with the above mixture with no untoward complications. The patient tolerated the procedure well. No samples, findings or blood loss was noted due to the fact that this was an injection.          Hand/upper extremity injection  Universal Protocol:  procedure performed by consultantConsent: Verbal consent obtained.  Risks and benefits: risks, benefits and alternatives were discussed  Consent given by: patient  Time out: Immediately prior to procedure a \"time out\" was called to verify the correct patient, procedure, equipment, support staff and site/side marked as required.  Patient " identity confirmed: verbally with patient  Supporting Documentation  Indications: pain   Procedure Details  Condition:trigger finger Needle size: 25 G  Ultrasound guidance: no  Medications administered: 1 mL lidocaine 1 %; 6 mg betamethasone acetate-betamethasone sodium phosphate 6 (3-3) mg/mL    Dx: left small finger trigger finger    PROCEDURE: A timeout was performed in which the side, site and laterality were confirmed by all present. After informed consent was obtained under sterile conditions a combination of 1% lidocaine and 6 mg betamethasone acetate-betamethasone sodium phosphate 6 (3-3) mg/mL were combined in a 1:1 mixture for a total of 2 mL. The area of injection was cleansed with an alcohol preparation and the left small finger A1 pulley was subsequently injected with the above mixture with no untoward complications. The patient tolerated the procedure well. No samples, findings or blood loss was noted due to the fact that this was an injection.                   _____________________________________________________        I agree with the history, physical examination, assessment and plan of care as documented above.    Raúl Oakes M.D.  Attending, Orthopaedic Surgery  Hand, Wrist, and Elbow Surgery  [facility]

## 2024-10-30 ENCOUNTER — OFFICE VISIT (OUTPATIENT)
Dept: OBGYN CLINIC | Facility: CLINIC | Age: 39
End: 2024-10-30
Payer: COMMERCIAL

## 2024-10-30 VITALS
SYSTOLIC BLOOD PRESSURE: 120 MMHG | HEIGHT: 70 IN | WEIGHT: 190 LBS | HEART RATE: 90 BPM | DIASTOLIC BLOOD PRESSURE: 70 MMHG | BODY MASS INDEX: 27.2 KG/M2

## 2024-10-30 DIAGNOSIS — M19.049 CMC ARTHRITIS: ICD-10-CM

## 2024-10-30 DIAGNOSIS — M65.331 TRIGGER MIDDLE FINGER OF RIGHT HAND: ICD-10-CM

## 2024-10-30 DIAGNOSIS — M65.352 TRIGGER LITTLE FINGER OF LEFT HAND: Primary | ICD-10-CM

## 2024-10-30 PROCEDURE — 99213 OFFICE O/P EST LOW 20 MIN: CPT | Performed by: STUDENT IN AN ORGANIZED HEALTH CARE EDUCATION/TRAINING PROGRAM

## 2024-10-30 PROCEDURE — 20600 DRAIN/INJ JOINT/BURSA W/O US: CPT | Performed by: STUDENT IN AN ORGANIZED HEALTH CARE EDUCATION/TRAINING PROGRAM

## 2024-10-30 RX ORDER — BETAMETHASONE SODIUM PHOSPHATE AND BETAMETHASONE ACETATE 3; 3 MG/ML; MG/ML
6 INJECTION, SUSPENSION INTRA-ARTICULAR; INTRALESIONAL; INTRAMUSCULAR; SOFT TISSUE
Status: COMPLETED | OUTPATIENT
Start: 2024-10-30 | End: 2024-10-30

## 2024-10-30 RX ORDER — LIDOCAINE HYDROCHLORIDE 10 MG/ML
1 INJECTION, SOLUTION INFILTRATION; PERINEURAL
Status: COMPLETED | OUTPATIENT
Start: 2024-10-30 | End: 2024-10-30

## 2024-10-30 RX ADMIN — BETAMETHASONE SODIUM PHOSPHATE AND BETAMETHASONE ACETATE 6 MG: 3; 3 INJECTION, SUSPENSION INTRA-ARTICULAR; INTRALESIONAL; INTRAMUSCULAR; SOFT TISSUE at 14:45

## 2024-10-30 RX ADMIN — LIDOCAINE HYDROCHLORIDE 1 ML: 10 INJECTION, SOLUTION INFILTRATION; PERINEURAL at 14:45

## 2024-10-30 NOTE — PROGRESS NOTES
ORTHOPAEDIC HAND, WRIST, AND ELBOW OFFICE  VISIT     ASSESSMENT/PLAN:    Richard Cantu is a 39 y.o. RHD male who presents with:  1. left small finger trigger finger s/p CSI 10/2/24 improving  2.  right long finger trigger finger s/p CSI 10/2/24, improving  3. 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 Given his constant pain despite attempted rest and acitvity modification since last visit we have recommended a CSI. Discussed risk and benefits of injection including lack of improvement in pain. If symptoms fail to improve at 6 weeks he should call office to schedule repeat clinical evaluation        The patient verbalized understanding of exam findings and treatment plan. We engaged in the shared decision-making process and treatment options were discussed at length with the patient. Surgical and conservative management discussed today along with risks and benefits.    Follow Up:  As needed             Trigger FInger: The anatomy and physiology of trigger finger was discussed with the patient today in the office.  Edema and increased contact pressure within the flexor tendons at the A1 pulley can cause pain, crepitation, and limitation of function.  Treatment options include resting MP blocking splints to decrease edema, oral anti-inflammatory medications, home or formal therapy exercises, up to 2 steroid injections within the tendon sheath, or surgical release.  While majority of patients do respond to conservative treatment, up to 20% may require surgical release.          ____________________________________________________________________________________________________________________________________________      CHIEF COMPLAINT:  Bilateral hand pain    SUBJECTIVE:  Richard Cantu is a 39 y.o. male who presents with recurrent right long finger trigger and new left small finger pain. Patient says previous episode of trigger finger was in 2019 in which he saw Dr. Munoz who provided him with a CSI. He states after last injection he had complete resolution of his symptoms for 4 years. Over the last few months he states he has started to feel recurrence of the pain he felt in 2019, thinks it was exacerbated by strain while playing with his children. Otherwise denies numbness or tingling to digits. No active mechanical symptoms, but pain in palm of right long and left small finger, worse with gripping improved with rest. At last visit he was provided with a CSI to both painful digits. He presents today with improved pain, but now developed discomfort about his right thumb. This has been increasingly painful with activities. He has tried activity modification, NSAIDs for pain relief with mild improvement. Given the pain and worsening since last visit he comes today for possible intervention. Denies further trauma. Denies numbness or tingling to extremity.    I have personally reviewed all the relevant PMH, PSH, SH, FH, Medications and allergies      PAST MEDICAL HISTORY:  Past Medical History:   Diagnosis Date    Patient denies medical problems        PAST SURGICAL HISTORY:  Past Surgical History:   Procedure Laterality Date    HIP SURGERY  2019    [place] ARTHROCENTESIS ASPIR&/INJ MAJOR JT/BURSA W/O US Right 07/13/2018    Procedure: Intra-articular hip joint injection;  Surgeon: Margarito Lobato MD;  Location: [facility] MAIN OR;  Service: Pain Management     TENDON REPAIR  2019       FAMILY HISTORY:  Family History   Problem Relation Age of Onset    Diabetes Father     Atrial  "fibrillation Father        SOCIAL HISTORY:  Social History     Tobacco Use    Smoking status: Never    Smokeless tobacco: Never   Vaping Use    Vaping status: Never Used   Substance Use Topics    Alcohol use: Yes     Comment: occ.    Drug use: No       MEDICATIONS:  No current outpatient medications on file.    ALLERGIES:  No Known Allergies        REVIEW OF SYSTEMS:  Pertinent items are noted in HPI.  A comprehensive review of systems was negative.    VITALS:  There were no vitals filed for this visit.      LABS:  HgA1c: No results found for: \"HGBA1C\"  BMP: No results found for: \"GLUCOSE\", \"CALCIUM\", \"NA\", \"K\", \"CO2\", \"CL\", \"BUN\", \"CREATININE\"    _____________________________________________________  PHYSICAL EXAMINATION:  General: well developed and well nourished, alert, oriented times 3, and appears comfortable  Psychiatric: Normal  HEENT: Normocephalic, Atraumatic Trachea Midline, No torticollis  Pulmonary: No audible wheezing or respiratory distress   Abdomen/GI: Non tender, non distended   Cardiovascular: No pitting edema, 2+ radial pulse   Skin: No masses, erythema, lacerations, fluctation, ulcerations  Neurovascular: Sensation Intact to the Median, Ulnar, Radial Nerve, Motor Intact to the Median, Ulnar, Radial Nerve, and Pulses Intact  Musculoskeletal: Normal, except as noted in detailed exam and in HPI.        FOCUSED MUSCULOSKELETAL EXAMINATION:  Right Upper Extremity  Inspection: skin intact, no notable deformity   Palpation:  mild ttp about right long finger A1 pulley  TTP at right thumb CMC joint dorsal and radial. Mild pain at insertion of APL.  Neurologic:  5/5 elbow flexion, 5/5 elbow extension, 5/5 wrist extension, 5/5 wrist flexion, 5/5 finger flexion, 5/5 finger extension, 5/5 FPL, 5/5 EPL, 5/5 APB, 5/5 intrinsics, sensation intact to median, radial, and ulnar nerve distributions  Vascular: Palpable radial pulse, brisk cap refill <2sec, hand warm and well perfused  MSK:   No clicking of right " "long finger through ROM  Pain with resisted thumb abduction, mild pain with CMC grind and shuck primarily at the dorsal radial aspect of thumb CMC joint. No instability      Left Upper Extremity  Inspection: skin intact, no notable deformity   Palpation:  mild ttp about left small finger A1 pulley  Neurologic:  5/5 elbow flexion, 5/5 elbow extension, 5/5 wrist extension, 5/5 wrist flexion, 5/5 finger flexion, 5/5 finger extension, 5/5 FPL, 5/5 EPL, 5/5 APB, 5/5 intrinsics, sensation intact to median, radial, and ulnar nerve distributions  Vascular: Palpable radial pulse, brisk cap refill <2sec, hand warm and well perfused  MSK:   no clicking through ROM of left small finger, no active locking   ___________________________________________________  STUDIES REVIEWED:  Xrays of the bilateral hands were obtained on 10/2/2024 were independently reviewed which demonstrates no acute fracture or dislocation there is mild joint space widening and early dorsoradial translation at CMC joint indicating early OA changes [place] stage 1        LABS REVIEWED:    HgA1c: No results found for: \"HGBA1C\"  BMP: No results found for: \"GLUCOSE\", \"CALCIUM\", \"NA\", \"K\", \"CO2\", \"CL\", \"BUN\", \"CREATININE\"            PROCEDURES PERFORMED:  Small joint arthrocentesis: R thumb CMC  [place] Protocol:  Consent: Verbal consent obtained.  Consent given by: patient  Supporting Documentation  Indications: pain   Procedure Details  Location: thumb - R thumb CMC  Preparation: Patient was prepped and draped in the usual sterile fashion  Needle size: 25 G  Ultrasound guidance: no  Approach: dorsal  Medications administered: 1 mL lidocaine 1 %; 6 mg betamethasone acetate-betamethasone sodium phosphate 6 (3-3) mg/mL    Patient tolerance: patient tolerated the procedure well with no immediate complications  Dressing:  Sterile dressing applied               _____________________________________________________        I agree with the history, physical " examination, assessment and plan of care as documented above.    Raúl Oakes M.D.  Attending, Orthopaedic Surgery  Hand, Wrist, and Elbow Surgery  [facility]

## (undated) DEVICE — TOWEL SET X-RAY

## (undated) DEVICE — SYRINGE 5ML LL

## (undated) DEVICE — GLOVE SRG BIOGEL 7.5

## (undated) DEVICE — PLASTIC ADHESIVE BANDAGE: Brand: CURITY

## (undated) DEVICE — WIPES BABY PAMPERS SENSITIVE 36/PK

## (undated) DEVICE — SMALL NEEDLE COUNTER NEST

## (undated) DEVICE — NEEDLE SPINAL 25G X 3.5 IN QUINCKE

## (undated) DEVICE — CHLORAPREP HI-LITE 10.5ML ORANGE

## (undated) DEVICE — RADIOLOGY STERILE LABELS: Brand: CENTURION

## (undated) DEVICE — NEEDLE BLUNT 18 G X 1 1/2 W FILTER

## (undated) DEVICE — UNIVERSAL BLOCK TRAY: Brand: INTEGRA®